# Patient Record
Sex: FEMALE | Race: WHITE
[De-identification: names, ages, dates, MRNs, and addresses within clinical notes are randomized per-mention and may not be internally consistent; named-entity substitution may affect disease eponyms.]

---

## 2017-03-24 ENCOUNTER — HOSPITAL ENCOUNTER (OUTPATIENT)
Dept: HOSPITAL 17 - CLAB | Age: 82
End: 2017-03-24
Attending: INTERNAL MEDICINE
Payer: MEDICARE

## 2017-03-24 DIAGNOSIS — R60.9: Primary | ICD-10-CM

## 2017-03-24 LAB
ANION GAP SERPL CALC-SCNC: 7 MEQ/L (ref 5–15)
BUN SERPL-MCNC: 19 MG/DL (ref 7–18)
CHLORIDE SERPL-SCNC: 103 MEQ/L (ref 98–107)
GFR SERPLBLD BASED ON 1.73 SQ M-ARVRAT: 53 ML/MIN (ref 89–?)
HCO3 BLD-SCNC: 30.4 MEQ/L (ref 21–32)
POTASSIUM SERPL-SCNC: 4.3 MEQ/L (ref 3.5–5.1)
SODIUM SERPL-SCNC: 140 MEQ/L (ref 136–145)

## 2017-03-24 PROCEDURE — 36415 COLL VENOUS BLD VENIPUNCTURE: CPT

## 2017-03-24 PROCEDURE — 80048 BASIC METABOLIC PNL TOTAL CA: CPT

## 2017-05-13 ENCOUNTER — HOSPITAL ENCOUNTER (OUTPATIENT)
Dept: HOSPITAL 17 - NEPE | Age: 82
Setting detail: OBSERVATION
LOS: 4 days | Discharge: HOME | End: 2017-05-17
Attending: SPECIALIST | Admitting: SPECIALIST
Payer: MEDICARE

## 2017-05-13 VITALS
DIASTOLIC BLOOD PRESSURE: 72 MMHG | RESPIRATION RATE: 16 BRPM | OXYGEN SATURATION: 97 % | SYSTOLIC BLOOD PRESSURE: 158 MMHG | TEMPERATURE: 98.2 F | HEART RATE: 78 BPM

## 2017-05-13 VITALS
HEART RATE: 76 BPM | SYSTOLIC BLOOD PRESSURE: 107 MMHG | DIASTOLIC BLOOD PRESSURE: 56 MMHG | OXYGEN SATURATION: 98 % | RESPIRATION RATE: 18 BRPM

## 2017-05-13 VITALS — WEIGHT: 190.26 LBS | HEIGHT: 62 IN | BODY MASS INDEX: 35.01 KG/M2

## 2017-05-13 VITALS
RESPIRATION RATE: 18 BRPM | OXYGEN SATURATION: 96 % | DIASTOLIC BLOOD PRESSURE: 74 MMHG | SYSTOLIC BLOOD PRESSURE: 131 MMHG | HEART RATE: 77 BPM

## 2017-05-13 DIAGNOSIS — I10: ICD-10-CM

## 2017-05-13 DIAGNOSIS — Z79.82: ICD-10-CM

## 2017-05-13 DIAGNOSIS — Z92.21: ICD-10-CM

## 2017-05-13 DIAGNOSIS — I25.2: ICD-10-CM

## 2017-05-13 DIAGNOSIS — Z92.3: ICD-10-CM

## 2017-05-13 DIAGNOSIS — Z95.5: ICD-10-CM

## 2017-05-13 DIAGNOSIS — Z85.3: ICD-10-CM

## 2017-05-13 DIAGNOSIS — M19.90: ICD-10-CM

## 2017-05-13 DIAGNOSIS — I25.119: Primary | ICD-10-CM

## 2017-05-13 DIAGNOSIS — F17.290: ICD-10-CM

## 2017-05-13 DIAGNOSIS — E66.9: ICD-10-CM

## 2017-05-13 DIAGNOSIS — E78.5: ICD-10-CM

## 2017-05-13 DIAGNOSIS — J06.9: ICD-10-CM

## 2017-05-13 DIAGNOSIS — Z88.8: ICD-10-CM

## 2017-05-13 DIAGNOSIS — J44.9: ICD-10-CM

## 2017-05-13 DIAGNOSIS — Z79.02: ICD-10-CM

## 2017-05-13 DIAGNOSIS — K21.9: ICD-10-CM

## 2017-05-13 LAB
ANION GAP SERPL CALC-SCNC: 8 MEQ/L (ref 5–15)
APTT BLD: 26.4 SEC (ref 24.3–30.1)
BASOPHILS # BLD AUTO: 0.1 TH/MM3 (ref 0–0.2)
BASOPHILS NFR BLD: 0.8 % (ref 0–2)
BUN SERPL-MCNC: 31 MG/DL (ref 7–18)
CHLORIDE SERPL-SCNC: 104 MEQ/L (ref 98–107)
CK SERPL-CCNC: 203 U/L (ref 26–192)
EOSINOPHIL # BLD: 0.3 TH/MM3 (ref 0–0.4)
EOSINOPHIL NFR BLD: 3 % (ref 0–4)
ERYTHROCYTE [DISTWIDTH] IN BLOOD BY AUTOMATED COUNT: 13.6 % (ref 11.6–17.2)
GFR SERPLBLD BASED ON 1.73 SQ M-ARVRAT: 51 ML/MIN (ref 89–?)
HCO3 BLD-SCNC: 29.4 MEQ/L (ref 21–32)
HCT VFR BLD CALC: 38 % (ref 35–46)
HEMO FLAGS: (no result)
INR PPP: 0.9 RATIO
LYMPHOCYTES # BLD AUTO: 2.8 TH/MM3 (ref 1–4.8)
LYMPHOCYTES NFR BLD AUTO: 32.6 % (ref 9–44)
MAGNESIUM SERPL-MCNC: 2.5 MG/DL (ref 1.5–2.5)
MCH RBC QN AUTO: 30.9 PG (ref 27–34)
MCHC RBC AUTO-ENTMCNC: 33.9 % (ref 32–36)
MCV RBC AUTO: 91.4 FL (ref 80–100)
MONOCYTES NFR BLD: 8.9 % (ref 0–8)
NEUTROPHILS # BLD AUTO: 4.6 TH/MM3 (ref 1.8–7.7)
NEUTROPHILS NFR BLD AUTO: 54.7 % (ref 16–70)
PLATELET # BLD: 188 TH/MM3 (ref 150–450)
POTASSIUM SERPL-SCNC: 4.2 MEQ/L (ref 3.5–5.1)
PROTHROMBIN TIME: 10.4 SEC (ref 9.8–11.6)
RBC # BLD AUTO: 4.15 MIL/MM3 (ref 4–5.3)
SODIUM SERPL-SCNC: 141 MEQ/L (ref 136–145)
WBC # BLD AUTO: 8.5 TH/MM3 (ref 4–11)

## 2017-05-13 PROCEDURE — 71010: CPT

## 2017-05-13 PROCEDURE — 93005 ELECTROCARDIOGRAM TRACING: CPT

## 2017-05-13 PROCEDURE — 82552 ASSAY OF CPK IN BLOOD: CPT

## 2017-05-13 PROCEDURE — 85610 PROTHROMBIN TIME: CPT

## 2017-05-13 PROCEDURE — 85730 THROMBOPLASTIN TIME PARTIAL: CPT

## 2017-05-13 PROCEDURE — 85025 COMPLETE CBC W/AUTO DIFF WBC: CPT

## 2017-05-13 PROCEDURE — 85027 COMPLETE CBC AUTOMATED: CPT

## 2017-05-13 PROCEDURE — 80061 LIPID PANEL: CPT

## 2017-05-13 PROCEDURE — C1769 GUIDE WIRE: HCPCS

## 2017-05-13 PROCEDURE — 85002 BLEEDING TIME TEST: CPT

## 2017-05-13 PROCEDURE — 82550 ASSAY OF CK (CPK): CPT

## 2017-05-13 PROCEDURE — C1893 INTRO/SHEATH, FIXED,NON-PEEL: HCPCS

## 2017-05-13 PROCEDURE — 80048 BASIC METABOLIC PNL TOTAL CA: CPT

## 2017-05-13 PROCEDURE — 84484 ASSAY OF TROPONIN QUANT: CPT

## 2017-05-13 PROCEDURE — 93306 TTE W/DOPPLER COMPLETE: CPT

## 2017-05-13 PROCEDURE — G0378 HOSPITAL OBSERVATION PER HR: HCPCS

## 2017-05-13 PROCEDURE — 83735 ASSAY OF MAGNESIUM: CPT

## 2017-05-13 PROCEDURE — 99291 CRITICAL CARE FIRST HOUR: CPT

## 2017-05-13 PROCEDURE — 93458 L HRT ARTERY/VENTRICLE ANGIO: CPT

## 2017-05-13 RX ADMIN — NITROGLYCERIN SCH MG: 0.4 TABLET SUBLINGUAL at 23:24

## 2017-05-13 RX ADMIN — NITROGLYCERIN SCH MG: 0.4 TABLET SUBLINGUAL at 23:29

## 2017-05-13 RX ADMIN — NITROGLYCERIN SCH MG: 0.4 TABLET SUBLINGUAL at 23:34

## 2017-05-13 NOTE — RADRPT
EXAM DATE/TIME:  05/13/2017 23:12 

 

[facility] COMPARISON:     

CHEST SINGLE AP, August 23, 2016, 10:42.

 

                     

INDICATIONS :     

Chest pain.

                     

 

MEDICAL HISTORY :     

Hypertension.  Myocardial infarction.  Carcinoma, breast.   COPD.   

 

SURGICAL HISTORY :     

Mastectomy, left.   

 

ENCOUNTER:     

Initial                                        

 

ACUITY:     

1 day      

 

PAIN SCORE:     

3/10

 

LOCATION:      

chest substernal.

 

FINDINGS:     

A single view of the chest demonstrates the lungs to be symmetrically aerated without evidence of mas
s, infiltrate or effusion.  The cardiomediastinal contours are unremarkable.  Osseous structures are 
intact.

 

CONCLUSION:     

No acute disease.  No significant change has occurred.  

 

 

 

 Zach Vicente MD on May 13, 2017 at 23:30           

Board Certified Radiologist.

 This report was verified electronically.

## 2017-05-13 NOTE — PD
HPI


Chief Complaint:  Chest Pain


Time Seen by Provider:  22:58


Travel History


International Travel<30 days:  No


Contact w/Intl Traveler<30days:  No


Traveled to known affect area:  No





History of Present Illness


HPI


81-year-old female with history of CAD with previous PCI to the mid LAD, HTN, 

HLD here with complaint of chest pressure.  Patient states that today she is 

been having substernal chest pressure, heaviness with associated shortness of 

breath and nausea.  She took 2 nitroglycerin around 7:30 and 8 PM without 

improvement of her symptoms prompting ER visit.  At this time patient's pain is 

mild, intermittent.  She states it feels similar to when she was hospitalized 

for her stent in 2016.  She continues to follow with Dr. armenta.  

States that she has not had any provocative testing since her PCI in 2016.





PFSH


Past Medical History


Hx Anticoagulant Therapy:  Yes


Arthritis:  Yes


Asthma:  No


Autoimmune Disease:  No


Blood Disorders:  No


Anxiety:  No


Depression:  No


Heart Rhythm Problems:  No


Cancer:  Yes (LEFT BREAST)


Cardiovascular Problems:  Yes (stent 16)


High Cholesterol:  No


Chemotherapy:  No


Chest Pain:  Yes


Congestive Heart Failure:  No


COPD:  Yes


Diabetes:  No


Diminished Hearing:  No


Endocrine:  No


Gastrointestinal Disorders:  Yes


Genitourinary:  Yes


Hypertension:  Yes


Immune Disorder:  No


Implanted Vascular Access Dvce:  Yes (cardiac stent)


Kidney Stones:  Yes


Musculoskeletal:  Yes


Neurologic:  No


Psychiatric:  No


Reproductive:  Yes (TOTAL HYSTERECTOMY)


Respiratory:  Yes


Immunizations Current:  No


Myocardial Infarction:  Yes ("SMALL MI YEARS AGO")


Radiation Therapy:  Yes


Sleep Apnea:  No


Thyroid Disease:  No


Tetanus Vaccination:  > 5 Years


Influenza Vaccination:  No


Menopausal:  Yes


:  13


Para:  12


Miscarriage:  1


Dilation and Curettage (D&C):  Yes





Past Surgical History


Abdominal Surgery:  No


AICD:  No


Arteriovenous Shunt:  No


Body Medical Devices:  cardiac stent, bliateral lens implants


Cardiac Surgery:  Yes (angioplasty 16)


Ear Surgery:  No


Endocrine Surgery:  No


Eye Surgery:  Yes (BL CATARACT REMOVAL, BL LENSE IMPLANT)


Genitourinary Surgery:  No


Gynecologic Surgery:  Yes (TOTAL HYSTERECTOMY)


Hysterectomy:  Yes


Insulin Pump:  No


Joint Replacement:  No


Mastectomy:  Yes (L MASTECTOMY)


Neurologic Surgery:  No


Oral Surgery:  No


Pacemaker:  No


Thoracic Surgery:  Yes


Other Surgery:  Yes (L PARTIAL MASTECTOMY)





Social History


Alcohol Use:  No


Tobacco Use:  No (uses snuff)


Substance Use:  No





Allergies-Medications


(Allergen,Severity, Reaction):  


Coded Allergies:  


     Lisinopril (Verified  Allergy, Mild, COUGH, 17)


Reported Meds & Prescriptions





Reported Meds & Active Scripts


Active


Nitrostat (Nitroglycerin) 0.3 Mg Subl 0.3 Mg SL PRN PRN


     FOR CHEST PAIN


Zantac 150 Mg Tab (Ranitidine HCl) 150 Mg Tab 150 Mg PO BID


K-Dur (Potassium Chloride) 20 Meq Tabcr 20 Meq PO BID


Plavix (Clopidogrel Bisulfate) 75 Mg Tab 75 Mg PO DAILY


Lipitor 80 Mg Tab (Atorvastatin) 80 Mg Tab 80 Mg PO HS


Imdur 30 Mg (Isosorbide Mononitrate) 30 Mg Tabcr 30 Mg PO DAILY@07 30 Days


Metoprolol Tartrate 25 mg (Metoprolol Tartrate) 25 Mg Tab 25 Mg PO Q12HR 30 Days


Losartan Potassium 50 Mg Tab 100 Mg PO DAILY 30 Days


Hydrodiuril (Hydrochlorothiazide) 25 Mg Tab 25 Mg PO DAILY 30 Days


Ecotrin (Aspirin) 81 Mg Tabec 81 Mg PO DAILY 30 Days








Review of Systems


Except as stated in HPI:  all other systems reviewed are Neg





Physical Exam


Narrative


GENERAL: Elderly female in no acute distress


SKIN: Focused skin assessment warm/dry.


HEAD:Normocephalic. 


EYES:  No scleral icterus. No injection or drainage. 


ENT:  Mucous membranes pink and moist.


NECK: Supple


CARDIOVASCULAR: Regular rate and rhythm.  No murmur appreciated.  No tenderness 

to palpation of the chest wall


RESPIRATORY: No accessory muscle use. Clear to auscultation. Breath sounds 

equal bilaterally. 


GASTROINTESTINAL: Abdomen soft, non-tender, nondistended.  Obese


MUSCULOSKELETAL: Trace BLE edema. 


NEUROLOGICAL: Awake and alert. Normal speech.


PSYCHIATRIC: Appropriate mood and affect; insight and judgment normal.





Data


Data


Last Documented VS





Vital Signs








  Date Time  Temp Pulse Resp B/P Pulse Ox O2 Delivery O2 Flow Rate FiO2


 


17 23:29  76 18 107/56 98 Room Air  


 


17 22:52 98.2       








Orders





 Electrocardiogram (17 23:00)


Basic Metabolic Panel (Bmp) (17 23:00)


Ckmb (Isoenzyme) Profile (17 23:00)


Complete Blood Count With Diff (17 23:00)


Magnesium (Mg) (17 23:00)


Prothrombin Time / Inr (Pt) (17 23:00)


Act Partial Throm Time (Ptt) (17 23:00)


Troponin I (17 23:00)


Chest, Single Ap (17 23:00)


Ecg Monitoring (17 23:00)


Bilateral Bp Monitoring (17 23:00)


Iv Access Insert/Monitor (17:00)


Oximetry (17 23:00)


Aspirin Chew (Aspirin Chew) (17 23:00)


Sodium Chloride 0.9% Flush (Ns Flush) (17 23:00)


Nitroglycerin Sl (Nitrostat Sl) (17 23:15)


Morphine Inj (Morphine Inj) (17 23:45)


CKMB (17 23:10)


CKMB% (17 23:10)


Heparin Infusion JULIA.Q1H (17 23:52)


Heparin Inj (Heparin Inj) (17 00:00)


Heparin Inj (Heparin Inj) (17 06:00)


Heparin Inj (Heparin Inj) (17 06:00)


Heparin-D5w Inj (Heparin-D5w Inj) (17 00:00)


Cbc No Diff, Includes Plts (17 06:00)


Act Partial Throm Time (Ptt) (17 06:52)


Occult Blood (Hemoccult) Stool (17 23:52)





Labs








 Laboratory Tests








Test 17





 23:10


 


White Blood Count 8.5 TH/MM3


 


Red Blood Count 4.15 MIL/MM3


 


Hemoglobin 12.9 GM/DL


 


Hematocrit 38.0 %


 


Mean Corpuscular Volume 91.4 FL


 


Mean Corpuscular Hemoglobin 30.9 PG


 


Mean Corpuscular Hemoglobin 33.9 %





Concent 


 


Red Cell Distribution Width 13.6 %


 


Platelet Count 188 TH/MM3


 


Mean Platelet Volume 8.5 FL


 


Neutrophils (%) (Auto) 54.7 %


 


Lymphocytes (%) (Auto) 32.6 %


 


Monocytes (%) (Auto) 8.9 %


 


Eosinophils (%) (Auto) 3.0 %


 


Basophils (%) (Auto) 0.8 %


 


Neutrophils # (Auto) 4.6 TH/MM3


 


Lymphocytes # (Auto) 2.8 TH/MM3


 


Monocytes # (Auto) 0.8 TH/MM3


 


Eosinophils # (Auto) 0.3 TH/MM3


 


Basophils # (Auto) 0.1 TH/MM3


 


CBC Comment DIFF FINAL 


 


Differential Comment  


 


Prothrombin Time 10.4 SEC


 


Prothromb Time International 0.9 RATIO





Ratio 


 


Activated Partial 26.4 SEC





Thromboplast Time 


 


Sodium Level 141 MEQ/L


 


Potassium Level 4.2 MEQ/L


 


Chloride Level 104 MEQ/L


 


Carbon Dioxide Level 29.4 MEQ/L


 


Anion Gap 8 MEQ/L


 


Blood Urea Nitrogen 31 MG/DL


 


Creatinine 1.04 MG/DL


 


Estimat Glomerular Filtration 51 ML/MIN





Rate 


 


Random Glucose 96 MG/DL


 


Calcium Level 9.0 MG/DL


 


Magnesium Level 2.5 MG/DL


 


Total Creatine Kinase 203 U/L


 


Troponin I 0.06 NG/ML














MDM


Medical Decision Making


Medical Screen Exam Complete:  Yes


Emergency Medical Condition:  Yes


Medical Record Reviewed:  Yes


Differential Diagnosis


81-year-old female with history of CAD with previous PCI to the mid LAD, HTN, 

HLD here with complaint of chest pain this evening, shortness of breath and 

nausea.  Differential includes ACS, atypical chest pain, musculoskeletal, GERD, 

and less likely PE or dissection.


Narrative Course


Patient placed on monitor, IV established and blood obtained.  A twelve-lead 

EKG shows sinus rhythm.  Patient has T-wave inversions in leads 1, aVL, V4 

through V6.  This is similar to patient's previous EKG.  She was given aspirin, 

nitroglycerin.  Portable chest x-ray obtained that by my read shows no acute 

abnormalities.  CBC, BMP, magnesium, CK-MB, troponin, coags notable for 

troponin 0.06.  Patient still having pain after nitroglycerin 1, blood 

pressure drop.  She was given morphine with improvement of her symptoms.  

Patient will be placed on heparin drip and admitted to medicine with cardiology 

consult for further management.


Critical Care Narrative


Aggregate critical care time was 35 minutes. Time to perform other separately 

billable procedures was not included in the critical care time. My time did not 

include minutes spent treating any other patients simultaneously or on 

activities that did not directly contribute to the patient's treatment.  





The services I provided to this patient were to treat and/or prevent clinically 

significant deterioration that could result in: Cardiopulmonary decompensation, 

death, disability





I provided critical care services requiring my management, as noted below:


Chart data review, documentation time, medication orders and management, vital 

sign assessments/reviewing monitor data, ordering and reviewing lab tests, 

ordering and interpreting/reviewing x-rays and diagnostic studies, care of the 

patient and discussion of the patient with the admitting physicians.





Diagnosis





 Primary Impression:  


 Non-ST elevation MI (NSTEMI)





Admitting Information


Admitting Physician Requests:  Admit








Kell Mayer MD May 13, 2017 23:14

## 2017-05-14 VITALS — HEART RATE: 66 BPM

## 2017-05-14 VITALS
TEMPERATURE: 97.8 F | OXYGEN SATURATION: 97 % | RESPIRATION RATE: 18 BRPM | SYSTOLIC BLOOD PRESSURE: 150 MMHG | DIASTOLIC BLOOD PRESSURE: 87 MMHG | HEART RATE: 66 BPM

## 2017-05-14 VITALS
HEART RATE: 67 BPM | RESPIRATION RATE: 18 BRPM | OXYGEN SATURATION: 97 % | DIASTOLIC BLOOD PRESSURE: 67 MMHG | SYSTOLIC BLOOD PRESSURE: 130 MMHG

## 2017-05-14 VITALS — HEART RATE: 64 BPM

## 2017-05-14 VITALS — HEART RATE: 58 BPM

## 2017-05-14 VITALS
OXYGEN SATURATION: 98 % | RESPIRATION RATE: 18 BRPM | SYSTOLIC BLOOD PRESSURE: 120 MMHG | HEART RATE: 61 BPM | DIASTOLIC BLOOD PRESSURE: 63 MMHG

## 2017-05-14 VITALS
TEMPERATURE: 97.4 F | DIASTOLIC BLOOD PRESSURE: 66 MMHG | SYSTOLIC BLOOD PRESSURE: 124 MMHG | HEART RATE: 67 BPM | OXYGEN SATURATION: 98 %

## 2017-05-14 VITALS
SYSTOLIC BLOOD PRESSURE: 128 MMHG | OXYGEN SATURATION: 97 % | TEMPERATURE: 97.3 F | RESPIRATION RATE: 18 BRPM | HEART RATE: 60 BPM | DIASTOLIC BLOOD PRESSURE: 70 MMHG

## 2017-05-14 VITALS
SYSTOLIC BLOOD PRESSURE: 121 MMHG | HEART RATE: 68 BPM | RESPIRATION RATE: 16 BRPM | OXYGEN SATURATION: 97 % | TEMPERATURE: 97.4 F | DIASTOLIC BLOOD PRESSURE: 70 MMHG

## 2017-05-14 VITALS
DIASTOLIC BLOOD PRESSURE: 72 MMHG | SYSTOLIC BLOOD PRESSURE: 138 MMHG | RESPIRATION RATE: 18 BRPM | OXYGEN SATURATION: 98 % | TEMPERATURE: 97.5 F | HEART RATE: 68 BPM

## 2017-05-14 VITALS — HEART RATE: 68 BPM

## 2017-05-14 VITALS — HEART RATE: 60 BPM

## 2017-05-14 VITALS — HEART RATE: 62 BPM

## 2017-05-14 VITALS — HEART RATE: 70 BPM

## 2017-05-14 VITALS — SYSTOLIC BLOOD PRESSURE: 136 MMHG | DIASTOLIC BLOOD PRESSURE: 65 MMHG

## 2017-05-14 LAB
APTT BLD: 48.9 SEC (ref 24.3–30.1)
APTT BLD: 56.8 SEC (ref 24.3–30.1)
CK MB SERPL-MCNC: 2.5 NG/ML (ref 0.5–3.6)
CK SERPL-CCNC: 167 U/L (ref 26–192)

## 2017-05-14 RX ADMIN — HEPARIN SODIUM SCH MLS/HR: 10000 INJECTION, SOLUTION INTRAVENOUS at 22:21

## 2017-05-14 RX ADMIN — ATORVASTATIN CALCIUM SCH MG: 80 TABLET, FILM COATED ORAL at 21:30

## 2017-05-14 RX ADMIN — CLOPIDOGREL BISULFATE SCH MG: 75 TABLET, FILM COATED ORAL at 08:31

## 2017-05-14 RX ADMIN — FAMOTIDINE SCH MG: 20 TABLET, FILM COATED ORAL at 08:31

## 2017-05-14 RX ADMIN — METOPROLOL TARTRATE SCH MG: 25 TABLET, FILM COATED ORAL at 08:31

## 2017-05-14 RX ADMIN — ASPIRIN 81 MG SCH MG: 81 TABLET ORAL at 08:31

## 2017-05-14 RX ADMIN — HEPARIN SODIUM SCH MLS/HR: 10000 INJECTION, SOLUTION INTRAVENOUS at 00:16

## 2017-05-14 RX ADMIN — METOPROLOL TARTRATE SCH MG: 25 TABLET, FILM COATED ORAL at 21:30

## 2017-05-14 NOTE — EC
Study

 

Study Date:05/14/2017

 

 

 

STUDY CONCLUSIONS

 

SUMMARY

 

- Left ventricle: The cavity size was normal. Wall thickness was

increased in a pattern of mild LVH. Systolic function was normal.

The estimated ejection fraction was in the range of 55% to 60%.

Wall motion was normal; there were no regional wall motion

abnormalities.

- Aortic valve: Valve area: 3.05cm^2 (Vmax).

- Mitral valve: Mildly calcified annulus.

 

-------------------------------------------------------------------

If LV function is below 40, please consider prescribing an ACEI or

ARB or document rationale for non-use.

 

-------------------------------------------------------------------

PROCEDURE DATA

 

STUDY STATUS:

Elective. Procedure: Transthoracic echocardiography.

Image quality was adequate. Scanning was performed from the

parasternal, apical, and subcostal acoustic windows. Study

completion: The patient tolerated the procedure well.

Transthoracic echocardiography. M-mode, complete 2D, complete

spectral Doppler, and color Doppler. Height: Height: 62in. Weight:

Weight: 184.6lb. Body mass index: BMI: 33.8kg/m^2. Body surface

area:   BSA: 1.85m^2. Patient status: Inpatient.

 

-------------------------------------------------------------------

CARDIAC ANATOMY

 

LEFT VENTRICLE:

The cavity size was normal. Wall thickness was

increased in a pattern of mild LVH. Systolic function was normal.

The estimated ejection fraction was in the range of 55% to 60%.

Wall motion was normal; there were no regional wall motion

abnormalities.

 

AORTIC VALVE:

Trileaflet; mildly thickened leaflets. Doppler:

Transvalvular velocity was within the normal range. There was no

stenosis. No regurgitation.  Valve area: 3.05cm^2 (Vmax). Indexed

valve area: 1.65cm^2/m^2 (Vmax).

 

AORTA:

Aortic root: The aortic root was normal in size.

 

MITRAL VALVE:

Mildly calcified annulus. Doppler: Transvalvular

velocity was within the normal range. There was no evidence for

stenosis. No regurgitation.  Peak gradient: 3mm Hg (D).

 

LEFT ATRIUM:

The atrium was normal in size.

 

RIGHT VENTRICLE:

The cavity size was normal. Wall thickness was

normal.

 

PULMONIC VALVE:

Doppler: Transvalvular velocity was within the

normal range. There was no evidence for stenosis. No regurgitation.

 

TRICUSPID VALVE:

Structurally normal valve. Doppler: Transvalvular

velocity was within the normal range. No regurgitation.

 

PULMONARY ARTERY:

The main pulmonary artery was normal-sized.

Systolic pressure was within the normal range.

 

RIGHT ATRIUM:

The atrium was normal in size.

 

PERICARDIUM:

There was no pericardial effusion.

 

-------------------------------------------------------------------

 

Patient weight: 184.6lb

_Ejection fraction:_ 65-75%

_Fractional shortening:_ 32%

up to 5Kg 5-11.5Kg 11.6-22.9Kg 23-45Kg 45-57Kg

Aortic Root 7-13      <17      13-22       17-27   17-27

LA diam     6-13      <23      24-38       33-47   37-40

RVID        10-17     7-15     7-15        7-18    8-17

LVIDd       12-22     <32      24-38       33-47   37-40

LVPW        2-4       3-6      5-7         6-8     7-8

IVS         2-4       3-6      5-7         6-8     7-8

 

-------------------------------------------------------------------

 

BASIC MEASUREMENTS                                     ADULT NORMAL

Left ventricle

LV internal dimension, ED, chordal      *42.2 mm       43-52

level, PLAX

LV internal dimension, ES, chordal       34.3 mm       23-38

level, PLAX

Fractional shortening, chordal level,     *19 %        >29

PLAX

LV posterior wall thickness, ED          12.1 mm       ------------

IVS/LVPW ratio, ED                       0.99          <1.3

Ventricular septum

Septal thickness, ED                       12 mm       ------------

Aortic valve

Leaflet separation                         23 mm       15-26

Aorta

Ascending aorta anterior-posterior         39 mm       ------------

diameter, S

 

BASIC MEASUREMENTS                                     ADULT NORMAL

Aortic valve

Leaflet separation                         23 mm       15-26

Aorta

Root diameter, ED                          23 mm       20-37

Left atrium

Anterior-posterior dimension, ES           36 mm       19-40

Anterior-posterior dimension index, ES   1.95 cm/m^2   <2.2

LA/aortic root ratio                     1.57          ------------

 

DOPPLER MEASUREMENTS                                   ADULT NORMAL

Main pulmonary artery

Pressure, S                                26 mm Hg    =30

Aortic valve

Peak velocity, S                         96.4 cm/s     ------------

Valve area, Vmax                         3.05 cm^2     ------------

Valve area index, Vmax                   1.65 cm^2/m^2 ------------

Mitral valve

Peak E-wave velocity                     81.4 cm/s     ------------

Peak A-wave velocity                     64.2 cm/s     ------------

Deceleration time                         204 ms       150-230

Peak gradient, D                            3 mm Hg    ------------

Peak E/A ratio                            1.3          ------------

Maximal regurgitant velocity              417 cm/s     ------------

Tricuspid valve

Regurgitant peak velocity                 191 cm/s     ------------

Peak RV-RA gradient, S                     15 mm Hg    ------------

Maximal regurgitant velocity              191 cm/s     ------------

Systemic veins

Estimated CVP                              10 mm Hg    ------------

Right ventricle

RV pressure, S                             28 mm Hg    <30

Pulmonic valve

Peak velocity, S                         65.2 cm/s     ------------

 

LEGEND:

Mean values are shown as u=mean value.

Asterisk (*) marks values outside specified normal range.

Prepared and signed by

 

Jeremias Bauer

[id number]

## 2017-05-14 NOTE — MB
cc:

QUIRINO NYE OTAKAR MD

****

 

 

DATE OF CONSULTATION:  05/14/2017

 

IMPRESSIONS:

1. Chest discomfort, rule out recurrent angina. The patient has a slight

elevation in troponin and CPK.

2. Known atherosclerotic heart disease, history of LAD stent in August of 2016.

3. Hypertension.

4. Dyslipidemia.

5. Obesity.

6. Esophageal reflux disease.

7. History of breast cancer status post left mastectomy, unclear if she got

radiation

 

RECOMMENDATIONS:

1. The patient should be treated as acute coronary syndrome.  She has been

started on heparin.

2. Continue dual antiplatelet therapy. She has been started on metoprolol.

3. Will keep the patient n.p.o. tonight until seen by Dr. Kaur. She is

pain-free at this time and has a normal exam.

 

CLINICAL DATA:

Mrs. Trevino is an 81-year-old female with known coronary artery disease

admitted to the hospital with chest discomfort.  She apparently was sitting at

home yesterday when she began to develop a pain in her left axillary area that

eventually radiated across to her sternum.  She had no definite diaphoresis,

shortness of breat, _________, pyrosis. the total duration of the discomfort

was proximally three hours.  She took two sublingual nitroglycerin with partial

relief of her chest discomfort however she was still having it when she

presented the emergency room. Her EKG demonstrated asymmetric T-wave inversions

in the lateral precordial leads.  Her troponin was minimally elevated.  She

received a dose of morphine and has had no pain since that time.

 

She has a history of acute coronary syndrome and possible NSTEMI in August

culminating in cardiac catheterization and LAD stent.  She states she is

compliant with her medication.  She does not smoke.

 

Risk factors include hypertension and dyslipidemia.  She has no history of

diabetes.

 

PAST SURGICAL HISTORY:

Her past surgical history includes:

1. Left mastectomy for cancer.

2. Total hysterectomy.

3. She has had cataract surgery.

 

 

SOCIAL HISTORY:

She does not currently smoke or use alcohol.

 

MEDICATIONS:

Her medications at the time of admission to the hospital included:

1. Sublingual nitroglycerin.

2. Ranitidine 150 twice a day

3. Potassium chloride 20 twice a day.

4. Plavix 75 daily.

5. Atorvastatin 80 daily.

6. Isosorbide mononitrate 30 daily.

7. Metoprolol 25 twice a day.

8. Losartan 50 daily.

9. HydroDIURIL 25 daily.

10. Enteric coated baby aspirin.

 

She has no history of congestive heart failure.  She apparently has had cardiac

arrhythmias in the past which have not required treatment.  She has no history

of pericardial heart disease.  She has no history of seizure, stroke or TIA.

She states that she does have COPD but does not require treatment.  There is no

history of overt GI bleeding.  No history of liver, gallbladder or kidney

disease.  No history of thyroid disease.  She has had no _____ deficits or

amaurosis fugax.  No lower extremity edema claudication type symptoms. Her

chest discomfort is completely resolved.  She has had no recent febrile

illness.

 

PHYSICAL EXAMINATION:

GENERAL:  The physical exam this time demonstrates an alert oriented female in

no apparent distress.

VITAL SIGNS: Blood pressure 120/70, heart rate is 70 and regular.

HEAD, EYES, EARS, NOSE, THROAT: Anicteric sclerae.

NECK: Jugular venous pressures are normal.  There are no carotid bruits.

CHEST/LUNGS: She has clear lung fields.

CARDIAC: Distant heart sounds, regular rate and rhythm, 1/6 systolic ejection

murmur.  No diastolic murmurs or gallops.

ABDOMEN: Abdomen soft and nontender.

EXTREMITIES: Free of cyanosis, clubbing or edema. Normal pulses.

 

LABORATORY EVALUATION:

White cell count 8500, hematocrit 38%, platelet count 188,000.

 

Lytes 141, 4.2, 104, 29, BUN 31, creatinine 1.

 

Troponin 0.06, .

 

EKGS:

12-lead electrocardiogram demonstrates a normal sinus rhythm, LVH by voltage

criteria, poor R-wave progression, embryonal R-wave in V2, asymmetric T-wave

inversions are noted in the lateral precordial leads.

 

IMAGING STUDIES:

Chest x-ray is unremarkable.

 

DISCUSSION:

This is an 81-year-old female with known atherosclerotic heart disease, history

of PCI in August admitted to hospital with chest discomfort, abnormal EKG and a

slight increase in enzymes.

 

Recommendations are as noted above.

 

 

 

                              _________________________________

                              DO BRIDGETT Laura/SALAZAR

D:  5/14/2017/2:39 PM

T:  5/14/2017/3:43 PM

Visit #:  [id number]

Job #:  [id number]

## 2017-05-14 NOTE — HHI.HP
[facility] [place]


Service


[facility]


Primary Care Physician


Richardson Adkins, DO


Admission Diagnosis


NSTEMI


Diagnoses:  


Chief Complaint:  


chest pain  (Aurea Reyes)





Travel History


International Travel<30 Days:  No


Contact w/Intl Traveler <30 Da:  No


Traveled to Known Affected Are:  No (Aurea Reyes)


History of Present Illness


This a pleasant 81-year-old elderly female with past medical history of MI, CAD

, previous angioplasty and stent to LAD August 2016 per Dr. armenta, 

hypertension, hyperlipidemia, COPD.  Patient presented to emergency room 

complaining of chest tightness had developed a she was resting.  Patient 

indicates his throat over the left breast radiated across chest wall, she feels 

slightly short of breath and weak, no nausea, no vomiting, no diaphoresis.  She 

took 2 nitroglycerin without any improvement and then was given a thorough 1.  

Chest discomfort was relieved by nitroglycerin.  Patient indicates she had this 

similar presentation when she had stent in August 2016.  She follows up with 

Dr. armenta regularly and is compliant with medications.  Patient was evaluated 

in the emergency room, laboratory workup was completed, essentially 

unremarkable.  EKG shows sinus rhythm with T-wave inversions in leads 1, aVL, 

V4 through V6.  This is similar to previous EKG.  Chest x-ray did not reveal 

any acute abnormalities.  Troponin was slightly elevated 0.06.  Patient was 

placed on a heparin drip.  Patient is now seen in [facility], at this time she is 

chest pain-free.  Patient is admitted for further evaluation and treatment. (

Aurea Reyes)





Review of Systems


Constitutional:  DENIES: Diaphoretic episodes, Fatigue, Fever, Weight gain, 

Weight loss, Chills, Dizziness, Change in appetite, Night Sweats


Endocrine:  DENIES: Abnorml menstrual pattern, Heat/cold intolerance, Polydipsia

, Polyuria, Polyphagia


Eyes:  DENIES: Blurred vision, Diplopia, Eye inflammation, Eye pain, Vision loss

, Photosensitivity, Double Vision


Ears, nose, mouth, throat:  DENIES: Tinnitus, Hearing loss, Vertigo, Nasal 

discharge, Oral lesions, Throat pain, Hoarseness, Ear Pain, Running Nose, 

Epistaxis, Sinus Pain, Toothache, Odynophagia


Respiratory:  COMPLAINS OF: Shortness of breath,  DENIES: Apneas, Cough, Snoring

, Wheezing, Hemoptysis, Sputum production


Cardiovascular:  COMPLAINS OF: Chest pain, Dyspnea on Exertion,  DENIES: 

Palpitations, Syncope, PND, Lower Extremity Edema, Orthopnea, Claudication


Gastrointestinal:  DENIES: Abdominal pain, Black stools, Bloody stools, 

Constipation, Diarrhea, Nausea, Vomiting, Difficulty Swallowing, Anorexia


Genitourinary:  DENIES: Abnormal vaginal bleeding, Dysmenorrhea, Dyspareunia, 

Sexual dysfunction, Urinary frequency, Urinary incontinence, Urgency, Hematuria

, Dysuria, Nocturia, Vaginal discharge


Musculoskeletal:  COMPLAINS OF: Joint pain,  DENIES: Muscle aches, Stiffness, 

Joint Swelling, Back pain, Neck pain


Hematologic/lymphatic:  DENIES: Bruising, Lymphadenopathy


Immunologic/allergic:  DENIES: Eczema, Urticaria


Neurologic:  DENIES: Abnormal gait, Headache, Localized weakness, Paresthesias, 

Seizures, Speech Problems, Tremor, Poor Balance


Psychiatric:  DENIES: Anxiety, Confusion, Mood changes, Depression, 

Hallucinations, Agitation, Suicidal Ideation, Homicidal Ideation, Delusions (

Aurea Reyes)





Past Family Social History


Past Medical History


hypertension hyperlipidemia breast cancer status post radiation and chemotherapy

, CAD with MI, stent August 2016,  arthritis


COPD


Past Surgical History


Hysterectomy, right arm surgery, knee arthroplasty


cardiac cath and angioplasty/stent LAURENCE to LAD 8/24/2017


Reported Medications





Reported Meds & Active Scripts


Active


Reported


Ranitidine (Ranitidine HCl) 150 Mg Cap 150 Mg PO DAILY


Potassium Chloride CR (Potassium Chloride) 10 Meq Tab 20 Meq PO BID


Nitroglycerin SL (Nitroglycerin) 0.3 Mg Subl 0.3 Mg SL AS DIRECTED PRN


     ONE TABLET UNDER THE TONGUE AS NEEDED FOR CHEST PAIN, MAY REPEAT EVERY


     FIVE MINUTES FOR A TOTAL OF 3 DOSES OR CALL 911 IF NO RELIEF


Metoprolol Tartrate 25 Mg Tab 25 Mg PO BID


Losartan (Losartan Potassium) 100 Mg Tab 100 Mg PO DAILY


Isosorbide Mononitrate 10 Mg Tab 30 Mg PO DAILY


     Take 2 doses 7 hours apart.


[hydrodiuril]   25 Mg PO DAILY


Plavix (Clopidogrel Bisulfate) 75 Mg Tab 75 Mg PO DAILY


Lipitor (Atorvastatin Calcium) 80 Mg Tab 80 Mg PO HS


Aspirin 81 Mg Chew 81 Mg CHEW DAILY (Aurea Reyes)


Allergies:  


Coded Allergies:  


     Lisinopril (Verified  Allergy, Mild, COUGH, 5/13/17)


Active Ordered Medications





 Inpatient Medications


Aspirin (Aspirin Chew) 81 mg DAILY CHEW ;  Start 5/14/17 at 09:00


Atorvastatin Calcium (Lipitor) 80 mg HS PO ;  Start 5/14/17 at 21:00


Clopidogrel Bisulfate (Plavix) 75 mg DAILY PO ;  Start 5/14/17 at 09:00


Famotidine (Pepcid) 20 mg DAILY PO ;  Start 5/14/17 at 09:00


Heparin Sodium (Porcine) (Heparin Inj) 5,000 units UNSCH  PRN IV APTT LESS THAN 

25;  Start 5/14/17 at 06:00


Heparin Sodium (Porcine) 2500 units 2,500 units UNSCH  PRN IV APTT 25 TO 39;  

Start 5/14/17 at 06:00


Heparin Sodium/ Dextrose (Heparin-D5W Inj) 250 ml @ 0 mls/hr TITRATE IV  Last 

administered on 5/14/17at 00:16;  Start 5/14/17 at 00:00


Metoprolol Tartrate (Lopressor) 25 mg BID PO ;  Start 5/14/17 at 09:00


Morphine Sulfate (Morphine Inj) 4 mg ONCE  ONCE IV PUSH  Last administered on 5/ 14/17at 00:02;  Start 5/13/17 at 23:45;  Stop 5/13/17 at 23:46;  Status DC


Nitroglycerin (Nitrostat Sl) 0.4 mg Q5M SL  Last administered on 5/13/17at 23:24

;  Start 5/13/17 at 23:15;  Stop 5/13/17 at 23:26;  Status DC


Sodium Chloride (NS Flush) 2 ml UNSCH  PRN IVF FLUSH AFTER USING IV ACCESS;  

Start 5/13/17 at 23:00


Family History


Positive for coronary artery disease in her mother


Social History


Patient is raising her 7-year-old grandson, one of her daughters lives close 

by.  Uses snuff since she was 7 years old, no alcohol abuse, no substance 

abuse.  Uses a walker for ambulation. (Aurea Reyes)





Physical Exam


Vital Signs





 Vital Signs








  Date Time  Temp Pulse Resp B/P Pulse Ox O2 Delivery O2 Flow Rate FiO2


 


5/14/17 07:01  70      


 


5/14/17 06:13  62      


 


5/14/17 05:00  68      


 


5/14/17 04:08  66      


 


5/14/17 03:57 97.4 67  124/66 98   


 


5/14/17 03:15  71 18 136/65 98 Nasal Cannula 2 


 


5/14/17 02:14  61 18 120/63 98 Nasal Cannula 2 


 


5/14/17 00:36  67 18 130/67 97 Nasal Cannula 2 


 


5/13/17 23:29  76 18 107/56 98 Room Air  


 


5/13/17 23:23  77 18 131/74 96 Room Air  


 


5/13/17 22:52 98.2 78 16 158/72 97   








Physical Exam


GENERAL: This is a well-nourished, well-developed patient, in no apparent 

distress.


SKIN: No rashes, ecchymoses or lesions. Cool and dry.


HEAD: Atraumatic. Normocephalic. No temporal or scalp tenderness.


EYES: Pupils equal round and reactive. Extraocular motions intact. No scleral 

icterus. No injection or drainage. 


ENT: Nose without bleeding, purulent drainage or septal hematoma. Throat 

without erythema, tonsillar hypertrophy or exudate. Uvula midline. Airway 

patent.


NECK: Trachea midline. No JVD or lymphadenopathy. Supple, nontender, no 

meningeal signs.


CARDIOVASCULAR: Regular rate and rhythm without murmurs, gallops, or rubs. 


RESPIRATORY: Clear to auscultation. Breath sounds equal bilaterally. No wheezes

, rales, or rhonchi.  


GASTROINTESTINAL: Abdomen soft, non-tender, nondistended. No hepato-splenomegaly

, or palpable masses. No guarding.


MUSCULOSKELETAL: Extremities without clubbing, cyanosis, or edema. No joint 

tenderness, effusion, or edema noted. No calf tenderness. Negative Homans sign 

bilaterally.


NEUROLOGICAL: Awake and alert. Cranial nerves II through XII intact.  Motor and 

sensory grossly within normal limits. Five out of 5 muscle strength in all 

muscle groups.  Normal speech.


Laboratory





Laboratory Tests








Test 5/13/17 5/14/17





 23:10 05:55


 


White Blood Count 8.5  


 


Red Blood Count 4.15  


 


Hemoglobin 12.9  


 


Hematocrit 38.0  


 


Mean Corpuscular Volume 91.4  


 


Mean Corpuscular Hemoglobin 30.9  


 


Mean Corpuscular Hemoglobin 33.9  





Concent  


 


Red Cell Distribution Width 13.6  


 


Platelet Count 188  


 


Mean Platelet Volume 8.5  


 


Neutrophils (%) (Auto) 54.7  


 


Lymphocytes (%) (Auto) 32.6  


 


Monocytes (%) (Auto) 8.9  


 


Eosinophils (%) (Auto) 3.0  


 


Basophils (%) (Auto) 0.8  


 


Neutrophils # (Auto) 4.6  


 


Lymphocytes # (Auto) 2.8  


 


Monocytes # (Auto) 0.8  


 


Eosinophils # (Auto) 0.3  


 


Basophils # (Auto) 0.1  


 


CBC Comment DIFF FINAL  


 


Differential Comment   


 


Prothrombin Time 10.4  


 


Prothromb Time International 0.9  





Ratio  


 


Activated Partial 26.4  48.9 





Thromboplast Time  


 


Sodium Level 141  


 


Potassium Level 4.2  


 


Chloride Level 104  


 


Carbon Dioxide Level 29.4  


 


Anion Gap 8  


 


Blood Urea Nitrogen 31  


 


Creatinine 1.04  


 


Estimat Glomerular Filtration 51  





Rate  


 


Random Glucose 96  


 


Calcium Level 9.0  


 


Magnesium Level 2.5  


 


Total Creatine Kinase 203  


 


Creatine Kinase MB 2.5  


 


Creatine Kinase MB % 1.2  


 


Troponin I 0.06  





 (Aurea Reyes)


Result Diagram:  


5/13/17 2310 5/13/17 2310





Imaging





Last Impressions








Chest X-Ray 5/13/17 2300 Signed





Impressions: 





 Service Date/Time:  Saturday, May 13, 2017 23:12 - CONCLUSION:  No acute 





 disease.  No significant change has occurred.       Zach Vicente MD 





 (Aurea Reyes)





Assessment and Plan


Problem List:  


(1) Chest pain


(2) Non-ST elevation MI (NSTEMI)


(3) HTN (hypertension)


(4) Dyspnea on exertion


(5) Hyperlipemia


(6) Snuff user


(7) COPD (chronic obstructive pulmonary disease)


Assessment and Plan


Admit to Dr. Cifuentes





81-year-old elderly female with history of prior MI, CAD, prior stent to LAD 

August 2016.  Presented to emergency room complaining of chest tightness with 

shortness of breath.  Mild elevation in troponin.  Admitted with non-STEMI


-Continue with heparin drip


Consult cardiology for evaluation


Continue with aspirin and Plavix


Continue with Lopressor 25 mg by mouth twice a day


Continue with statins, Lipitor 80 mg by mouth daily at bedtime





COPD, stable


Continue with supplemental oxygen


DuoNeb's when necessary for any wheezing





Hypertension, stable


Continue home medications





Hyperlipidemia


-Lipid profile has been ordered


Continue statins





Daily snuff use


-patient has been counseled about use





Home medications reviewed, initiated as indicated


Continue with heparin for DVT prophylaxis


Plan of care has been discussed with the patient, attending and registered 

nurse.  Further management of the patient will be dependent on the hospital 

course





This patient was seen by myself and Dr. Cifuentes, this H&P is written on his behalf

 (Aurea Reyes)


Assessment and Plan


Patient seen and examined as above


Chart reviewed including meds labs radiological data and notes.


Discussed with patient and patient's family at bedside in detail


Plan of care discussed with NAKITA


Condition guarded (Héctor Cifuentes MD)





Problem Qualifiers





(1) Chest pain:  


Qualified Code:  R07.2 - Precordial pain


(2) HTN (hypertension):  


Qualified Code:  I10 - Essential hypertension


(3) Hyperlipemia:  


Qualified Code:  E78.5 - Hyperlipidemia, unspecified hyperlipidemia type


(4) COPD (chronic obstructive pulmonary disease):  


Qualified Code:  J44.9 - Chronic obstructive pulmonary disease, unspecified 

COPD type





Aurea Reyes May 14, 2017 08:42


Héctor Cifuentes MD May 14, 2017 09:21

## 2017-05-14 NOTE — EKG
Date Performed: 05/13/2017       Time Performed: 23:05:53

 

PTAGE:      81 years

 

EKG:      ECTOPIC ATRIAL RHYTHM ST DEVIATION AND MODERATE T-WAVE ABNORMALITY, CONSIDER LATERAL ISCHEM
IA Possible left ventricular hypertrophy. Possible old inferior wall infarct. Possible old anterosept
al infarct. When compared to previous tracing, the ectopic rhythm is new, But otherwise no significan
t serial change. ABNORMAL ECG

 

PREVIOUS TRACING       :   08/27/2016   [id number]

 

DOCTOR:   Emerald Castillo  Interpretating Date/Time  05/14/2017 15:07:04

## 2017-05-15 VITALS — HEART RATE: 68 BPM

## 2017-05-15 VITALS
DIASTOLIC BLOOD PRESSURE: 88 MMHG | TEMPERATURE: 97.1 F | SYSTOLIC BLOOD PRESSURE: 146 MMHG | OXYGEN SATURATION: 98 % | RESPIRATION RATE: 18 BRPM | HEART RATE: 74 BPM

## 2017-05-15 VITALS
OXYGEN SATURATION: 96 % | SYSTOLIC BLOOD PRESSURE: 146 MMHG | RESPIRATION RATE: 18 BRPM | HEART RATE: 75 BPM | DIASTOLIC BLOOD PRESSURE: 87 MMHG | TEMPERATURE: 98 F

## 2017-05-15 VITALS
OXYGEN SATURATION: 97 % | SYSTOLIC BLOOD PRESSURE: 139 MMHG | DIASTOLIC BLOOD PRESSURE: 71 MMHG | TEMPERATURE: 97.6 F | HEART RATE: 63 BPM | RESPIRATION RATE: 18 BRPM

## 2017-05-15 VITALS
TEMPERATURE: 97.5 F | DIASTOLIC BLOOD PRESSURE: 73 MMHG | SYSTOLIC BLOOD PRESSURE: 135 MMHG | HEART RATE: 68 BPM | OXYGEN SATURATION: 95 % | RESPIRATION RATE: 18 BRPM

## 2017-05-15 VITALS — HEART RATE: 84 BPM

## 2017-05-15 VITALS — HEART RATE: 60 BPM

## 2017-05-15 VITALS
SYSTOLIC BLOOD PRESSURE: 135 MMHG | RESPIRATION RATE: 16 BRPM | HEART RATE: 66 BPM | DIASTOLIC BLOOD PRESSURE: 69 MMHG | TEMPERATURE: 97.4 F | OXYGEN SATURATION: 98 %

## 2017-05-15 VITALS — HEART RATE: 73 BPM

## 2017-05-15 VITALS
HEART RATE: 77 BPM | SYSTOLIC BLOOD PRESSURE: 152 MMHG | TEMPERATURE: 97.4 F | DIASTOLIC BLOOD PRESSURE: 83 MMHG | RESPIRATION RATE: 16 BRPM | OXYGEN SATURATION: 97 %

## 2017-05-15 VITALS — HEART RATE: 64 BPM

## 2017-05-15 VITALS
RESPIRATION RATE: 16 BRPM | SYSTOLIC BLOOD PRESSURE: 143 MMHG | DIASTOLIC BLOOD PRESSURE: 68 MMHG | OXYGEN SATURATION: 96 % | TEMPERATURE: 97.9 F | HEART RATE: 64 BPM

## 2017-05-15 VITALS — HEART RATE: 58 BPM

## 2017-05-15 VITALS — HEART RATE: 76 BPM

## 2017-05-15 VITALS — HEART RATE: 70 BPM

## 2017-05-15 VITALS — HEART RATE: 74 BPM

## 2017-05-15 VITALS — HEART RATE: 78 BPM

## 2017-05-15 VITALS — HEART RATE: 66 BPM

## 2017-05-15 LAB
APTT BLD: 56.9 SEC (ref 24.3–30.1)
ERYTHROCYTE [DISTWIDTH] IN BLOOD BY AUTOMATED COUNT: 13.9 % (ref 11.6–17.2)
HCT VFR BLD CALC: 40 % (ref 35–46)
HDLC SERPL-MCNC: 47.5 MG/DL (ref 40–60)
LDLC SERPL-MCNC: 114 MG/DL (ref 0–99)
MCH RBC QN AUTO: 31.2 PG (ref 27–34)
MCHC RBC AUTO-ENTMCNC: 34.1 % (ref 32–36)
MCV RBC AUTO: 91.6 FL (ref 80–100)
PLATELET # BLD: 201 TH/MM3 (ref 150–450)
RBC # BLD AUTO: 4.37 MIL/MM3 (ref 4–5.3)
REVIEW FLAG: (no result)
WBC # BLD AUTO: 6.4 TH/MM3 (ref 4–11)

## 2017-05-15 RX ADMIN — ATORVASTATIN CALCIUM SCH MG: 80 TABLET, FILM COATED ORAL at 21:04

## 2017-05-15 RX ADMIN — CLOPIDOGREL BISULFATE SCH MG: 75 TABLET, FILM COATED ORAL at 08:10

## 2017-05-15 RX ADMIN — FAMOTIDINE SCH MG: 20 TABLET, FILM COATED ORAL at 08:09

## 2017-05-15 RX ADMIN — METOPROLOL TARTRATE SCH MG: 25 TABLET, FILM COATED ORAL at 21:04

## 2017-05-15 RX ADMIN — METOPROLOL TARTRATE SCH MG: 25 TABLET, FILM COATED ORAL at 08:09

## 2017-05-15 RX ADMIN — ASPIRIN 81 MG SCH MG: 81 TABLET ORAL at 08:10

## 2017-05-15 NOTE — HHI.PR
Subjective


Remarks


Sitting on side of bed for lunch


Walked to bathroom without shortness of breath or pain


Alert oriented


Afebrile


Denies any chest pain or shortness of breath (Leigh Ann Kruse)





Objective


Objective Results


-





 Vital Signs








  Date Time  Temp Pulse Resp B/P Pulse Ox O2 Delivery O2 Flow Rate FiO2


 


5/15/17 11:15 97.5 68 18 135/73 95   


 


5/15/17 08:15 97.6 63 18 139/71 97   


 


5/15/17 06:00  60      


 


5/15/17 05:00  74      


 


5/15/17 04:00  73      


 


5/15/17 03:00 97.4 73 16 135/69 98   


 


5/15/17 03:00  66      


 


5/15/17 02:00  70      


 


5/15/17 01:00  60      


 


5/15/17 00:00 97.1 74 18 146/88 98   


 


5/15/17 00:00  74      


 


5/14/17 23:00  60      


 


5/14/17 22:00  66      


 


5/14/17 21:00  70      


 


5/14/17 20:00  70      


 


5/14/17 19:00 97.5 68 18 138/72 98   


 


5/14/17 19:00  68      


 


5/14/17 16:01  68      


 


5/14/17 15:10 97.8 66 18 150/87 97   


 


5/14/17 15:00  62      


 


5/14/17 14:00  64      








 I/O








 5/14/17 5/14/17 5/14/17 5/15/17 5/15/17 5/15/17





 07:00 15:00 23:00 07:00 15:00 23:00


 


Intake Total 240 ml  597 ml   


 


Output Total   700 ml   


 


Balance 240 ml  -103 ml   


 


      


 


Intake Oral 240 ml  480 ml   


 


IV Total   117 ml   


 


Output Urine Total   700 ml   


 


# Voids   2 3  





 (Leigh Ann Kruse)


Result Diagram:  


5/15/17 0535                                                                   

             5/13/17 2310








ROS


General:  Fatigue (mild), Other (10 point ROS done positives noted other 

systems negative or unremarkable)


Cardiac:  Chest Pain (controlled) (Leigh Ann Kruse)





Physical Exam


Physical Exam


PHYSICAL EXAMINATION


GENERAL:  This is a well-developed, obese   female 


who appears to be in no acute distress.  She  is alert and awake, appropriate 

communication


HEAD:  Normocephalic without any lesion or mass noted.  Facial features


appear symmetric.


OROPHARYNGEAL:  Oropharynx without erythema or edema.


NECK:  Supple.  No nuchal rigidity or lymphadenopathy.


Trachea midline without deviation.  


CARDIAC:  Regular rhythm, regular rate, S1 and S2 are heard.  Heart sounds 

distant


LUNGS:  Clear to auscultation bilaterally anteriorly and posteriorly without 

wheezes rales or rhonchi


ABDOMEN:  Soft, round, nontender, no organomegaly or masses.  Bowel sounds are


heard in all four quadrants.  No rebound.  No guarding.


EXTREMITIES:  No edema.  Pulses equal bilateral.  


NEUROLOGICAL:  Patient mood and affect appropriate. No focal deficit


SKIN:Warm and moist, dry


Objective Remarks


I had no chest pain since I got here, but this pain felt just like my other 

heart pain. (Leigh Ann Kruse)





A/P


Assessment and Plan


1) Chest pain


(2) Non-ST elevation MI (NSTEMI)


(3) HTN (hypertension)


(4) Dyspnea on exertion


(5) Hyperlipemia


(6) Snuff user


(7) COPD (chronic obstructive pulmonary disease)





Chest pain, non-STEMI


-Continue with heparin drip, DVT prophylaxis and known coronary artery disease


Cardiac consult done, patient maintained on heparin drip


Heart catheter scheduled for tomorrow p.m., with possible stent


O2 at 2 L as needed


Activity out of bed to bathroom, no shortness of breath no pain since 

admission.  Patient is tolerating activity with no problems


Instructed to call immediately for any chest pain or shortness of breath





COPD, stable, never smoked, but was around secondhand smoke, uses snuff


DuoNeb's when necessary for any wheezing





Hypertension, stable


Continue home medications





Hyperlipidemia


Medical management





Bowel regimen


Pepcid for PUD prophylaxis





Discussed with nurse


Discussed with patient


Discussed with Dr. Valdez, patient seen on his behalf


 (Leigh Ann Kruse)


Assessment and Plan


pt is seen and examined as above 


meds and labs reviewed


plan of care dw arnp


dw pt 


ilan cardiology input 


for c cath tomorrow hopefully  (Héctor Valdez MD)








Leigh Ann Kruse May 15, 2017 13:46


Héctor Valdez MD May 15, 2017 14:56

## 2017-05-15 NOTE — PD.CARD.PN
Subjective


Subjective Remarks


No CP or SOB, feels better





Objective


Medications





 Current Medications








 Medications


  (Trade)  Dose


 Ordered  Sig/Kaitlin


 Route  Start Time


 Stop Time Status Last Admin


 


  (NS Flush)  2 ml  UNSCH  PRN


 IVF  5/13/17 23:00


     


 


 


  (Heparin Inj)  5,000 units  UNSCH  PRN


 IV  5/14/17 06:00


     


 


 


 Heparin Sodium


  (Porcine) 2500


 units  2,500 units  UNSCH  PRN


 IV  5/14/17 06:00


     


 


 


  (Heparin-D5W Inj)  250 ml @ 0


 mls/hr  TITRATE


 IV  5/14/17 00:00


    5/14/17 22:21


 


 


  (Aspirin Chew)  81 mg  DAILY


 CHEW  5/14/17 09:00


    5/15/17 08:10


 


 


  (Lipitor)  80 mg  HS


 PO  5/14/17 21:00


    5/14/17 21:30


 


 


  (Plavix)  75 mg  DAILY


 PO  5/14/17 09:00


    5/15/17 08:10


 


 


  (Lopressor)  25 mg  BID


 PO  5/14/17 09:00


    5/15/17 08:09


 


 


  (Pepcid)  20 mg  DAILY


 PO  5/14/17 09:00


    5/15/17 08:09


 








Vital Signs / I&O





 Vital Signs








  Date Time  Temp Pulse Resp B/P Pulse Ox O2 Delivery O2 Flow Rate FiO2


 


5/15/17 15:15 98.0 75 18 146/87 96   


 


5/15/17 13:01  66      


 


5/15/17 12:00  68      


 


5/15/17 11:15 97.5 68 18 135/73 95   


 


5/15/17 11:00  58      


 


5/15/17 10:00  64      


 


5/15/17 09:00  66      


 


5/15/17 08:15 97.6 63 18 139/71 97   


 


5/15/17 08:00  58      


 


5/15/17 07:00  58      


 


5/15/17 06:00  60      


 


5/15/17 05:00  74      


 


5/15/17 04:00  73      


 


5/15/17 03:00 97.4 73 16 135/69 98   


 


5/15/17 03:00  66      


 


5/15/17 02:00  70      


 


5/15/17 01:00  60      


 


5/15/17 00:00 97.1 74 18 146/88 98   


 


5/15/17 00:00  74      


 


5/14/17 23:00  60      


 


5/14/17 22:00  66      


 


5/14/17 21:00  70      


 


5/14/17 20:00  70      


 


5/14/17 19:00 97.5 68 18 138/72 98   


 


5/14/17 19:00  68      








 I/O








 5/14/17 5/14/17 5/14/17 5/15/17 5/15/17 5/15/17





 06:59 14:59 22:59 06:59 14:59 22:59


 


Intake Total 240 ml  597 ml   


 


Output Total   700 ml   


 


Balance 240 ml  -103 ml   


 


      


 


Intake Oral 240 ml  480 ml   


 


IV Total   117 ml   


 


Output Urine Total   700 ml   


 


# Voids   2 3  








Physical Exam


GENERAL: In NAD


SKIN: Warm and dry.


HEAD: Normocephalic.


EYES: No scleral icterus. No injection or drainage. 


NECK: Supple, trachea midline. No JVD or lymphadenopathy.


CARDIOVASCULAR: Regular rate and rhythm without murmurs, gallops, or rubs. 


RESPIRATORY: Breath sounds equal bilaterally. No accessory muscle use.


GASTROINTESTINAL: Abdomen soft, non-tender, nondistended. 


MUSCULOSKELETAL: No cyanosis, or edema. 





Laboratory





Laboratory Tests








Test 5/14/17 5/15/17





 17:10 05:35


 


Total Creatine Kinase 167 U/L 


 


Troponin I 0.06 NG/ML 0.12 NG/ML


 


White Blood Count  6.4 TH/MM3


 


Red Blood Count  4.37 MIL/MM3


 


Hemoglobin  13.6 GM/DL


 


Hematocrit  40.0 %


 


Mean Corpuscular Volume  91.6 FL


 


Mean Corpuscular Hemoglobin  31.2 PG


 


Mean Corpuscular Hemoglobin  34.1 %





Concent  


 


Red Cell Distribution Width  13.9 %


 


Platelet Count  201 TH/MM3


 


Mean Platelet Volume  8.7 FL


 


Activated Partial  56.9 SEC





Thromboplast Time  


 


Triglycerides Level  131 MG/DL


 


Cholesterol Level  188 MG/DL


 


LDL Cholesterol  114 MG/DL


 


HDL Cholesterol  47.5 MG/DL


 


Cholesterol/HDL Ratio  3.95 RATIO








Imaging





Last Impressions








Chest X-Ray 5/13/17 2300 Signed





Impressions: 





 Service Date/Time:  Saturday, May 13, 2017 23:12 - CONCLUSION:  No acute 





 disease.  No significant change has occurred.       Zach Vicente MD 











Assessment and Plan


Problem List:  


(1) Non-ST elevation MI (NSTEMI)


(2) CAD (coronary artery disease)


(3) Presence of stent in LAD coronary artery


(4) HTN (hypertension)


(5) Hyperlipemia


Assessment and Plan


No recurrent angina. Enzymes abnormal, c/w NSTEMI. Proceed with cath/PCI 

tomorrow. D/w pt and family, they wish to proceed.





Problem Qualifiers





(1) HTN (hypertension):  


Qualified Code:  I10 - Essential hypertension


(2) Hyperlipemia:  


Qualified Code:  E78.5 - Hyperlipidemia, unspecified hyperlipidemia type





Asha Kaur MD May 15, 2017 16:59

## 2017-05-16 VITALS
RESPIRATION RATE: 18 BRPM | SYSTOLIC BLOOD PRESSURE: 140 MMHG | DIASTOLIC BLOOD PRESSURE: 81 MMHG | TEMPERATURE: 97.6 F | OXYGEN SATURATION: 96 % | HEART RATE: 78 BPM

## 2017-05-16 VITALS
SYSTOLIC BLOOD PRESSURE: 140 MMHG | DIASTOLIC BLOOD PRESSURE: 68 MMHG | TEMPERATURE: 97.7 F | OXYGEN SATURATION: 96 % | RESPIRATION RATE: 18 BRPM | HEART RATE: 66 BPM

## 2017-05-16 VITALS
TEMPERATURE: 97.6 F | RESPIRATION RATE: 20 BRPM | SYSTOLIC BLOOD PRESSURE: 130 MMHG | OXYGEN SATURATION: 94 % | DIASTOLIC BLOOD PRESSURE: 57 MMHG | HEART RATE: 78 BPM

## 2017-05-16 VITALS
DIASTOLIC BLOOD PRESSURE: 86 MMHG | OXYGEN SATURATION: 97 % | RESPIRATION RATE: 18 BRPM | HEART RATE: 65 BPM | TEMPERATURE: 97.7 F | SYSTOLIC BLOOD PRESSURE: 158 MMHG

## 2017-05-16 VITALS
SYSTOLIC BLOOD PRESSURE: 151 MMHG | DIASTOLIC BLOOD PRESSURE: 81 MMHG | RESPIRATION RATE: 18 BRPM | TEMPERATURE: 97.8 F | OXYGEN SATURATION: 97 % | HEART RATE: 66 BPM

## 2017-05-16 VITALS — HEART RATE: 68 BPM

## 2017-05-16 VITALS — HEART RATE: 78 BPM

## 2017-05-16 VITALS — HEART RATE: 64 BPM

## 2017-05-16 VITALS — HEART RATE: 72 BPM

## 2017-05-16 VITALS — HEART RATE: 88 BPM

## 2017-05-16 VITALS
RESPIRATION RATE: 18 BRPM | TEMPERATURE: 97.6 F | OXYGEN SATURATION: 97 % | SYSTOLIC BLOOD PRESSURE: 153 MMHG | DIASTOLIC BLOOD PRESSURE: 88 MMHG | HEART RATE: 64 BPM

## 2017-05-16 VITALS — HEART RATE: 62 BPM

## 2017-05-16 VITALS — HEART RATE: 66 BPM

## 2017-05-16 VITALS — HEART RATE: 67 BPM

## 2017-05-16 VITALS — HEART RATE: 71 BPM

## 2017-05-16 VITALS — HEART RATE: 58 BPM

## 2017-05-16 VITALS — HEART RATE: 59 BPM

## 2017-05-16 VITALS — HEART RATE: 92 BPM

## 2017-05-16 LAB
APTT BLD: 50.4 SEC (ref 24.3–30.1)
ERYTHROCYTE [DISTWIDTH] IN BLOOD BY AUTOMATED COUNT: 13.8 % (ref 11.6–17.2)
HCT VFR BLD CALC: 40.4 % (ref 35–46)
HDLC SERPL-MCNC: 46.3 MG/DL (ref 40–60)
LDLC SERPL-MCNC: 74 MG/DL (ref 0–99)
MCH RBC QN AUTO: 31.3 PG (ref 27–34)
MCHC RBC AUTO-ENTMCNC: 34.4 % (ref 32–36)
MCV RBC AUTO: 91 FL (ref 80–100)
PLATELET # BLD: 177 TH/MM3 (ref 150–450)
RBC # BLD AUTO: 4.44 MIL/MM3 (ref 4–5.3)
REVIEW FLAG: (no result)
WBC # BLD AUTO: 7.7 TH/MM3 (ref 4–11)

## 2017-05-16 RX ADMIN — METOPROLOL TARTRATE SCH MG: 25 TABLET, FILM COATED ORAL at 08:17

## 2017-05-16 RX ADMIN — ASPIRIN 81 MG SCH MG: 81 TABLET ORAL at 08:18

## 2017-05-16 RX ADMIN — FAMOTIDINE SCH MG: 20 TABLET, FILM COATED ORAL at 08:17

## 2017-05-16 RX ADMIN — CLOPIDOGREL BISULFATE SCH MG: 75 TABLET, FILM COATED ORAL at 08:17

## 2017-05-16 RX ADMIN — ATORVASTATIN CALCIUM SCH MG: 80 TABLET, FILM COATED ORAL at 21:56

## 2017-05-16 RX ADMIN — METOPROLOL TARTRATE SCH MG: 25 TABLET, FILM COATED ORAL at 21:57

## 2017-05-16 NOTE — CATHPROC
Patient Name: NARAYAN ALMANZA     MRN: [id number]       Study #: [id number]

Initial MD: Asha Kaur       YOB: 1935  Study Date: 05/16/2017

 

 

               Cardiac Catheterization Report

 

 

 

 

5/16/2017 3:20:13 PM         Financial #: [id number]     1 of 10

Patient Name: NARAYAN ALMANZA         MRN: [id number]                  Study #: [id number]

Initial MD: Asha Kaur           YOB: 1935             Study Date: 05/16/2017

 

 

                   Entire Case Report

Patient Information

Patient Name   NARAYAN ALMANZA   Date of Birth   1935         Age     81 years

 

MRN        [id number]          Financial #    [id number]        Gender    F

 

AlternateID                   Lab Number    3

 

Accession #                   Room Number

 

Height (in)    62.0             Height (cm)    157.4           BSA     1.87

 

Weight (lbs)   188.5             Weight (kg)    85.7

 

Patient Address/Phone Number

Home Address              City                 State   Zip Code      Home Phone Number

 

[address] [place]     [address] [phone]

 

Study Information

Study Number   Admission           Scheduled Start                Study Start

 

950-17      05/14/2017 05/16/2017 05/16/2017

 

Study Type

 

Left Heart Cath

 

Referring Institution              Admit Source                  Facility Department

 

1                        Other                     [facility] - Cath Lab

 

Physician and Clinical Staff

Initial Asha Ybarra        Circulator        Jake Bhatt,RN

 

                        Circulator        Carmela Alarcon,OSEI

 

                        Other          cathlab, cathlab

 

                        Recorder         Yulia Lane,RRT TECH2

 

                        Scrub          Brady Wilson,RT(R)

 

Procedures Performed

Procedure                    Location (Site)               Vessel Name

 

Angiogram LV                  LV                      Ventricle

Coronary Angiograms                LCA                    Left Coronary

Coronary Angiograms                RCA                    Right Coronary

L Heart Cath

 

 

 

 

5/16/2017 3:20:13 PM             Financial #: [id number]                2 of 10

Patient Name: NARAYAN ALMANZA            MRN: [id number]           Study #: [id number]

Initial MD: Asha Kaur              YOB: 1935      Study Date: 05/16/2017

 

 

Equipment

Time            Description            Size      Mfg Part Number   Used/Scraped

                    TRANSDUCER, TRUWAVE

14:48    JOSUE PADILLA                        *       [id number]       Used

                    W/MAURICE

                                             [phone]



                                             *[id number]

                                             [phone]



                                             *[id number]

                                             [phone]



                                             *[id number]

                                             [id number]

14:48    [employer]    PACK, CCL CUSTOM          *                 Used

                                             *[id number]

14:48    [employer]       PEN, SKIN DUAL W/ RULER      *       [id number]      Used

 

14:48    [employer] MED[employer]       WIRE, 3MMJ .035 180CM       180CM     [id number]     Used

                    PROBE COVER, STERILE

14:48    [employer]                       *       [id number]       Used

                    ULTRASOUND W/ GEL

                                             [id number]

14:48    [employer]           MANIFOLD, 4 PORT          *                 Used

                                             *[id number]

                                             [id number]

14:48    [employer]           TUBING, HIGH PRESSURE 48"     48"                Used

                                             *[id number]

14:48    [employer]          OMNIPAQUE, 350 MG, 100ML      100ML     [id number]       Used

 

14:54    [employer]          OMNIPAQUE, 350 MG, 50ML      50ML      [id number]       Used

                                             [id number]

14:48    [employer]       BLANKET,WARM AIR CCL        *                 Used

                                             *[id number]

14:48    [employer]      SHEATH, FR5 TERUMO (10CM)     FR 5      [id number]       Used

 

 

Insurance Information

Insurance Payor

 

Medicaid, Private Health Insurance

 

Third Party          Third Party Number

[employer]ARE

 

History: Current Medications

Medication         Dosage/Unit       Route       Frequency  Last Date/Time Taken

 

PLAVIX

 

LIPITOR

 

ASA

 

 

History: Allergies

Allergy              Reaction

 

Lisinopril             COUGH

 

 

 

 

5/16/2017 3:20:13 PM                 Financial #: [id number]         3 of 10

Patient Name: NARAYAN ALMANZA              MRN: [id number]                 Study #: [id number]

Initial MD: Asha Kaur                YOB: 1935            Study Date: 05/16/201
7

 

 

History: Risk Factors

                       Family History of

Hypertension     Dyslipidemia                    Previous MI    Previous Heart Failure

                       Premature CAD

Yes         Yes           Yes            Yes        No

Prior Valve

           Prior PCI        Prior PCIDate       Prior CABG

Surgery

No          Yes           08/24/2016        No

           Cerebrovascular     Peripheral Artery     Chronic Lung

On Dialysis                                        Diabetes

           Disease         Disease          Disease

No          No            No            Yes        No

 

 

History: Symptoms/Diagnosis Selection Items

Chest pain

 

 

 

Labs

Hgb (g/dl)        Hct (%)         WBC (l/cumm)        Platelets (thousands)

 

12.00-18.00        37.00-55.00       4.80-10.80         140..00

 

13.9           40.4           7.7             177

 

Glucose (mg/dl)      BUN (mg/dl)       Creatinine (mg/dl)    BUN:Creatinine (1:x)

60..00       8.00-20.00        0.10-9.00         10.00-20.00

 

96            31            1.0            31

 

Na (meq/l)        K (meq/l)

 

138..00       3.80-5.10

 

141            4.2

 

INR (PTT:PT)

 

0.50-2.00

 

0.9

 

Troponin I (ng/ml)    CPK (u/l)

 

0.40-2.30         37..00

 

0.1            167

 

 

 

 

Medication

Medication Total Dose (Bolus/Oral)

Medication               Total Dosage/Unit

 

1% XYLOCAINE                20 mL

 

FENTANYL                 100 mcg

 

VERSED                    2 mg

 

 

 

 

5/16/2017 3:20:13 PM                   Financial #: [id number]               4 of 10

Patient Name: NARAYAN ALMANZA              MRN: [id number]                   Study #: [id number]

Initial MD: Asha Kaur                YOB: 1935              Study Date: 05/16/2
017

 

Medications (Bolus/Oral)

Medication           Time Given          Dosage/Unit       Administered By      Reason

 

VERSED             5/16/2017 2:43:15 PM      2 mg         Carmela Alarcon

2 mg VERSED given in lab by Carmela Alarcon RN in Right Antecubital via Peripheral IV.

 

FENTANYL            5/16/2017 2:44:00 PM     50 mcg         Carmela Alarcon

50 mcg FENTANYL given in lab by Carmela Alarcon RN in Right Hand via Peripheral IV.

 

FENTANYL            5/16/2017 2:44:57 PM     50 mcg         Carmela Alarcon

Patient arrived on 50 mcg FENTANYL given by Carmela Alarcon RN in Right Antecubital via Peripheral I
V.

 

1% XYLOCAINE          5/16/2017 2:49:18 PM     20 mL         Asha Kaur

20 mL 1% XYLOCAINE given in lab by Asha Kaur in Right Groin via Subcutaneous.

 

Medication (Drip)

Medication           Time Given          Dosage/Unit      Concentration/Unit  Diluent (ml)   Solution


 

IV Solutions          5/16/2017 2:32:02 PM      0 mL (IV)                          NaCl .9

Patient arrived on IV Solutions given by Asha Kaur in Right Antecubital via Peripheral IV. Pump
/Drip Flow = 20 ml/hr using NaCl .9.

 

 

Initial Case Assessment

Cardiovascular

HR             NIBP

 

64             122/111

 

Edema Present        Skin color             Skin

 

None            Normal               Warm Dry

 

Circulatory - Right Pulses

Dorsalis Pedis       Femoral

 

1              1

 

Scale (0,1,2,3,4,d)

 

Circulatory - Left Pulses

Dorsalis Pedis       Femoral

 

1              1

 

Scale (0,1,2,3,4,d)

 

Neurological State

              Oriented to time-place-

Alert                        Moves all extremities

              person

 

Respiration - General

Respiration Rate

              SpO2 (%)

(B/min)

13             98

 

 

 

 

5/16/2017 3:20:13 PM                   Financial #: [id number]                    5 of 10

Patient Name: NARAYAN ALMANZA           MRN: [id number]               Study #: [id number]

Initial MD: Asha Kaur             YOB: 1935          Study Date: 05/16/2017

 

 

Final Case Assessment

Cardiovascular

HR            NIBP

 

70            159/84

 

Edema Present       Skin color          Skin

 

None           Normal            Warm Dry

 

Circulatory - Right Pulses

Dorsalis Pedis      Femoral

 

1             1

 

Scale (0,1,2,3,4,d)

 

Circulatory - Left Pulses

Dorsalis Pedis      Femoral

 

1             1

 

Scale (0,1,2,3,4,d)

 

Neurological State

             Oriented to time-place-

Alert                       Moves all extremities

             person

 

Respiration - General

Respiration Rate

             SpO2 (%)

(B/min)

13            98

 

Vitals Summary

                                            Pain

  Time  HR     NIBP   SpO2  Resp   Temp   EtCO2  Apnea   Narinder      Florentino Comment

                                            Level

14:34:52   64    122/111  98.0                      10    0    2

 

14:40:28  106    168/97   97.0   13                  10    0    2

 

14:45:04   70    148/84   96.0   17                  10    0    2

 

14:50:01   72    108/76   97.0   16                  10    0    2

 

14:55:23   70    174/89   99.0   19                  10    0    2

 

15:00:05   73    154/83   98.0   15                  10    0    2

 

15:05:00   69    160/88   98.0   14                  10    0    2

 

15:10:03   64    162/88   98.0   17                  10    0    2

 

15:15:05   67    159/84   98.0   15                  10    0    2

 

 

 

 

5/16/2017 3:20:13 PM               Financial #: [id number]              6 of 10

Patient Name: NARAYAN ALMANZA             MRN: [id number]                Study #: [id number]

Initial MD: Asha Kaur               YOB: 1935           Study Date: 05/16/2017

 

 

Narinder Score Summary

Time         Activity      Resp         Circ     LOC       Color       Total Score

14:34:52         2         2            2       2       2           10

 

14:40:28         2         2            2       2       2           10

 

14:45:04         2         2            2       2       2           10

 

14:50:01         2         2            2       2       2           10

 

14:55:23         2         2            2       2       2           10

 

15:00:05         2         2            2       2       2           10

 

15:05:00         2         2            2       2       2           10

 

15:10:03         2         2            2       2       2           10

 

15:15:05         2         2            2       2       2           10

 

 

Narinder Score Definition Table

Activity - 0                   Activity - 1               Activity - 2

No Movement to Command              Weak Hand Grasp              Lift Head, Good Hand Grasp

 

Respiration - 0                 Respiration - 1              Respiration - 2

Apneic or Obstructed               Shallow Breath, Airway Adjunct      Deep Breath, Cough Freely

 

Circulation - 0                 Circulation - 1              Circulation - 2

B/P > 50% Admission B/P             B/P > 20-50% Admission B/P        B/P Stable X3

 

Level of Consciousness - 0            Level of Consciousness - 1        Level of Consciousness - 2

Not Responding                  Arousable On Calling           Awake and Aware

 

Color - 0                    Color- 1                 Color - 2

Cyanotic Lips, Nailbed, Skin           Pale, Dusky                Pink Or Normal

 

 

Chronological Log

Time      Study Chronological Log

 

14:31:49    Patient arrived via Bed.

 

14:31:51    Patient Name, D.O.B, / Armband Verified By R.N.

 

14:31:52    Consent signed by the physician and the patient and verified by the Cath Lab staff.

 

14:31:53    Pre-op and post- op instructions given; patient acknowledges understanding of instruction
s.

 

14:31:55    Patient has been NPO for More than 6Hrs.

 

14:31:56    Skin Breakdown-

 

14:31:58    Barbie Prominences Protected

 

14:32:01    A # 20 IV was noted in the Antecubital (right). Grade = 0

        Patient arrived on IV Solutions given by Asha Kaur in Right Antecubital via Peripheral 
IV. Pump/Drip Flow = 20

14:32:02

        ml/hr using NaCl .9.

14:32:04    History and physical on the chart or being dictated.

        Vitals capture started with the following parameters, Patient=Adult, Interval=5 min, Initial 
Pressure=140 mmHg,

14:34:22

        Deflation Rate=5 mmHg

 

 

5/16/2017 3:20:13 PM                 Financial #: [id number]               7 of 10

Patient Name: NARAYAN ALMANZA           MRN: [id number]               Study #: [id number]

Initial MD: Asha Kaur             YOB: 1935          Study Date: 05/16/2017

 

14:34:52  HR=64 bpm, QHVC=149/111 mmhg, SpO2=98 %, Pain=0, Narinder=10, Florentino=2

      Assessment: Initial Case, HR=64 BPM, XEWT=891/111 mmhg, Edema=None, Color=Normal, Skin = Warm, 
Dry

      Right Pulses: Finn Ped=1, Femoral=1

14:39:15  Left Pulses: Finn Ped=1, Femoral=1

      Neurological: State=Alert, Ox3, GUNDERSON

      Respiration: Resp=13 B/min, SpO2=98 %

14:40:28  ZG=641 bpm, GWGD=354/97 mmhg, SpO2=97.0 %, Resp=13 B/min, Pain=0, Narinder=10, Florentino=2

 

14:43:15  2 mg VERSED given in lab by Carmela lAarcon RN in Right Antecubital via Peripheral IV.

 

14:43:36  Reference ECG taken

 

14:43:43  Bilateral groins prepped with 2% chlorhexidine, and with a 3 min. waiting time.

 

14:44:00  50 mcg FENTANYL given in lab by Carmela Alarcon RN in Right Hand via Peripheral IV.

 

14:44:57  Patient arrived on 50 mcg FENTANYL given by Carmela Alarcon, RN in Right Antecubital via Pe
ripheral IV.

 

14:45:04  HR=70 bpm, TTID=814/84 mmhg, SpO2=96.0 %, Resp=17 B/min, Pain=0, Narinder=10, Florentino=2

      Time Out. Correct patient, correct procedure,correct physician, ,power injector loaded or not l
oaded with contrast with

14:46:31

      surgical team present. Time Out Concurred by MD, individual staff and CRNA in procedure

14:46:46  Case Start

 

14:46:47  Pressure channel 1 zeroed.

 

14:49:18  20 mL 1% XYLOCAINE given in lab by Asha Kaur in Right Groin via Subcutaneous.

 

14:49:30  Access site was Left Femoral Artery.

 

14:50:00  A SHEATH, FR5 TERUMO (10CM) FR 5 was advanced into the Fem Art (right) using the Modified S
eldinger technique.

 

14:50:00  A PIGTAIL ANG. INFINITI CATHETER FR 5 was advanced over a wire. contrast was used for injec
tions.

 

14:50:01  HR=72 bpm, GKFN=887/76 mmhg, SpO2=97.0 %, Resp=16 B/min, Pain=0, Narinder=10, Florentino=2

      Recorded Pressure: LV, HR=86, Condition=Condition 1

14:50:03

      (Left Ventricle) /14/16

14:50:45  The LV was injected at 10 cc/sec for a total of 30. OMNIPAQUE, 350 MG, 50ML 50ML used.

      Recorded Pressure: LV, Ao, HR=78, Condition=Condition 1

14:51:25  (Left Ventricle) /18/14,

      (Aorta) Ao 143/55/94

14:51:25  Catheter was removed

      A JL 4.0 INFINITI CATHETER FR 5 was advanced over a wire. OMNIPAQUE, 350 MG, 100ML 100ML was us
ed for

14:51:55

      injections.

      Recorded Pressure: Ao, HR=68, Condition=Condition 1

14:53:45

      (Aorta) Ao 170/71/111

14:53:54  The  LCA was injected and visualized at various angles. OMNIPAQUE, 350 MG, 100ML 100ML used
.

 

14:55:07  Catheter was removed

      A JL 4.0 INFINITI CATHETER FR 5 was advanced over a wire. OMNIPAQUE, 350 MG, 100ML 100ML was us
ed for

14:55:22

      injections.

14:55:23  HR=70 bpm, DAGG=169/89 mmhg, SpO2=99.0 %, Resp=19 B/min, Pain=0, Narinder=10, Florentino=2

 

14:56:35  The  RCA was injected and visualized at various angles. OMNIPAQUE, 350 MG, 100ML 100ML used
.

 

14:56:49  Catheter was removed

 

14:56:52  Case End

 

14:57:29  ACT (Normal Range ) = 177

 

15:00:05  HR=73 bpm, HYQV=596/83 mmhg, SpO2=98 %, Resp=15 B/min, Pain=0, Narinder=10, Florentino=2

 

 

5/16/2017 3:20:13 PM                Financial #: [id number]              8 of 10

Patient Name: NARAYAN ALMANZA              MRN: [id number]             Study #: [id number]

Initial MD: Asha Kaur                YOB: 1935        Study Date: 05/16/2017

 

15:01:05   Sheath removed; pressure applied to access site.

 

15:05:00   HR=69 bpm, NIBP=160/88 mmhg, SpO2=98 %, Resp=14 B/min, Pain=0, Narinder=10, Florentino=2

 

15:10:03   HR=64 bpm, ZPCV=024/88 mmhg, SpO2=98 %, Resp=17 B/min, Pain=0, Narinder=10, Florentino=2

 

15:15:05   HR=67 bpm, JVMZ=075/84 mmhg, SpO2=98.0 %, Resp=15 B/min, Pain=0, Narinder=10, Florentino=2

       Assessment: Final Case, HR=70 BPM, LSOM=423/84 mmhg, Edema=None, Color=Normal, Skin = Warm, Dr
y

       Right Pulses: Finn Ped=1, Femoral=1

15:16:45   Left Pulses: Finn Ped=1, Femoral=1

       Neurological: State=Alert, Ox3, GUNDERSON

       Respiration: Resp=13 B/min, SpO2=98 %

15:17:06   Vitals capture stopped.

 

15:17:38   Sterile dressing applied to site

 

15:17:42   No case complications noted.

 

15:17:52   Cine recording checked.

 

15:17:56   Bedside Report will be given.

 

15:17:57   Contrast Scanned

 

15:18:01   A Left Heart Cath was performed.

 

15:19:01   Patient moved to stretcher

 

15:19:12   Clinical correlaton risk stratification.

 

 

Recorded Pressures: Condition 1

Time         Chamber       Pressure         Manual Override (*)

 

14:50:03       LV          149/14/16 s/bd/ed

 

14:51:25       LV          141/18/14 s/bd/ed

 

14:51:25       Ao          143/55/94 s/d/m

 

14:53:45       Ao          170/71/111 s/d/m

 

 

 

 

End Study - Contrast Media Used In Study

Contrast        Total Opened (mL)     Total Used (mL)       Total Wasted (mL)

 

Omnipaque       80             80              0

 

End Study - Maximum Contrast Load

Max Contrast Load (mL)

 

428.4

 

End Study - Radiation Exposure

Fluoro Time

(minutes)

1.5

 

 

 

5/16/2017 3:20:13 PM                   Financial #: [id number]           9 of 10

Patient Name: NARAYAN ALMANZA     MRN: [id number]       Study #: [id number]

Initial MD: Asha Kaur       YOB: 1935  Study Date: 05/16/2017

 

End Study - Patient Disposition

Complications    Transferred To

 

No          Telemetry Bed

 

 

 

 

5/16/2017 3:20:13 PM         Financial #: [id number]     10 of 10

## 2017-05-16 NOTE — HHI.PR
Subjective


Remarks


Sitting on chair without any apparent distress


patient had good night sleep


Walked to bathroom without shortness of breath or pain


Alert oriented


Afebrile


Denies any chest pain or shortness of breath 


review of systems a 12 point system otherwise unremarkable





Objective


Objective Results


-





 Vital Signs








  Date Time  Temp Pulse Resp B/P Pulse Ox O2 Delivery O2 Flow Rate FiO2


 


5/16/17 08:01 97.6 78 18 140/81 96   


 


5/16/17 07:01  62      


 


5/16/17 06:00  67      


 


5/16/17 05:00  72      


 


5/16/17 04:00  92      


 


5/16/17 03:00 97.8 78 18 151/81 97   


 


5/16/17 03:00  66      


 


5/16/17 02:00  68      


 


5/16/17 01:00  66      


 


5/16/17 00:00  58      


 


5/15/17 23:00 97.9 70 16 143/68 96   


 


5/15/17 23:00  64      


 


5/15/17 22:00  74      


 


5/15/17 21:00  76      


 


5/15/17 20:00  84      


 


5/15/17 19:00  72      


 


5/15/17 19:00 97.4 77 16 152/83 97   


 


5/15/17 18:00  64      


 


5/15/17 17:01  68      


 


5/15/17 16:00  78      


 


5/15/17 15:15 98.0 75 18 146/87 96   


 


5/15/17 15:00  68      


 


5/15/17 14:00  74      


 


5/15/17 13:01  66      


 


5/15/17 12:00  68      


 


5/15/17 11:15 97.5 68 18 135/73 95   


 


5/15/17 11:00  58      


 


5/15/17 10:00  64      








 I/O








 5/15/17 5/15/17 5/15/17 5/16/17 5/16/17 5/16/17





 06:59 14:59 22:59 06:59 14:59 22:59


 


Intake Total   939 ml 350 ml  


 


Output Total   600 ml 800 ml  


 


Balance   339 ml -450 ml  


 


      


 


Intake Oral   720 ml 240 ml  


 


IV Total   219 ml 110 ml  


 


Output Urine Total   600 ml 800 ml  


 


# Voids 3  4   


 


# Bowel Movements   0   








Result Diagram:  


5/16/17 0356                                                                   

             5/13/17 2310





Imaging





Last Impressions








Chest X-Ray 5/13/17 2300 Signed





Impressions: 





 Service Date/Time:  Saturday, May 13, 2017 23:12 - CONCLUSION:  No acute 





 disease.  No significant change has occurred.       Zach Vicente MD 








Other Results





Laboratory Tests








Test 5/16/17





 03:56


 


White Blood Count 7.7 


 


Red Blood Count 4.44 


 


Hemoglobin 13.9 


 


Hematocrit 40.4 


 


Mean Corpuscular Volume 91.0 


 


Mean Corpuscular Hemoglobin 31.3 


 


Mean Corpuscular Hemoglobin 34.4 





Concent 


 


Red Cell Distribution Width 13.8 


 


Platelet Count 177 


 


Mean Platelet Volume 8.4 


 


Activated Partial 50.4 





Thromboplast Time 











Physical Exam


Physical Exam


GENERAL:  This is a well-developed, obese   female 


who appears to be in no acute distress.  She  is alert and awake, appropriate 

communication


HEAD:  Normocephalic without any lesion or mass noted.  Facial features


appear symmetric.


OROPHARYNGEAL:  Oropharynx without erythema or edema.


NECK:  Supple.  No nuchal rigidity or lymphadenopathy.


Trachea midline without deviation.  


CARDIAC:  Regular rhythm, regular rate, S1 and S2 are heard.  Heart sounds 

distant


LUNGS:  Clear to auscultation bilaterally anteriorly and posteriorly without 

wheezes rales or rhonchi


ABDOMEN:  Soft, round, nontender, no organomegaly or masses.  Bowel sounds are


heard in all four quadrants.  No rebound.  No guarding.


EXTREMITIES:  No edema.  Pulses equal bilateral.  


NEUROLOGICAL:  Patient mood and affect appropriate. No focal deficit


SKIN:Warm and moist, dry


Objective Remarks


I had no chest pain since I got here, but this pain felt just like my other 

heart pain





A/P


Assessment and Plan


(1) Chest pain


(2) Non-ST elevation MI (NSTEMI)


(3) HTN (hypertension)


(4) Dyspnea on exertion


(5) Hyperlipemia


(6) Snuff user


(7) COPD (chronic obstructive pulmonary disease)





Chest pain, non-STEMI


-heparin drip, DVT prophylaxis and known coronary artery disease


Cardiac consult done, patient maintained on heparin drip


Heart catheter scheduled for today afternoon


O2 at 2 L as needed


Activity out of bed to bathroom, no shortness of breath no pain since 

admission.  Patient is tolerating activity with no problems


Instructed to call immediately for any chest pain or shortness of breath





COPD, stable, never smoked, but was around secondhand smoke, uses snuff


DuoNeb's when necessary for any wheezing





Hypertension, stable


Continue home medications





Hyperlipidemia


Medical management





Bowel regimen


Pepcid for PUD prophylaxis





Labs reviewed


Discussed with nurse


Discussed with patient


We will follow








Héctor Valdez MD May 16, 2017 09:11

## 2017-05-16 NOTE — MA
cc:

CCList****

 

 

DATE: 5/16/2017

 

INDICATION

Non ST elevation myocardial infarction, class IV angina, coronary artery

disease, history of mid LAD stenting.

 

PROCEDURE PERFORMED

1. Retrograde left heart catheterization with left ventriculography and 
selective

coronary angiography.

2. Moderate sedation.

 

ACCESS SITE

Right femoral artery.

 

EQUIPMENT USED

5 Guinean pigtail catheter, 5 Guinean JL4 and AR modified coronary artery

catheters.

 

MEDICATIONS

Versed IV, Fentanyl IV.

 

CONTRAST

Omnipaque 80 cc.

 

COMPLICATIONS

None.

 

BLOOD LOSS:

Less than 10 cc

 

METHOD OF HEMOSTASIS

Manual compression.

 

RESULTS

 

HEMODYNAMICS

Heart rate 70 beats per minute.

Left ventricular end-diastolic pressure 14 mmHg.

Left ventricle 150/4.

Aorta 150/71/111.

 

LEFT VENTRICULOGRAPHY

Left ventricular ejection fraction 60%.  Wall motion normal.  No mitral

regurgitation.

 

CORONARY ANGIOGRAPHY

The left main coronary artery patent.

 

Left descending coronary artery has mild 30% stenosis in the mid portion.

There is patent stent in the mid portion.

 

First diagonal artery has 60% stenosis at its ostium and in the proximal

portion.

 

Left circumflex artery is patent.

OM1 is patent.

OM2 and left PDA is patent.

 

Right coronary artery is small and patent.

Right PDA is small and patent.

This is co-dominant circulation.

 

DIAGNOSIS

1. Mild to moderate coronary artery disease with patent stent in the mid left

   anterior descending coronary artery.

2. Preserved left ventricular systolic function.

 

DISPOSITION

Ms. Trevino can be reassured about her cardiac status.  Her stent in the mid

LAD is patent and the study shows no evidence of disease progression.  Her left

ventricular function is well preserved. I recommend to continue her current

medical program including aggressive modification in her cardiac risk factors.

I will see her back for followup in our office after discharge.

 

 

 

                              _________________________________

                              MD KAMI Layne/MAREN

D:  5/16/2017/3:07 PM

T:  5/16/2017/7:18 PM

Visit #:  [id number]

Job #:  [id number]

NICK

## 2017-05-17 VITALS
OXYGEN SATURATION: 98 % | DIASTOLIC BLOOD PRESSURE: 71 MMHG | SYSTOLIC BLOOD PRESSURE: 137 MMHG | HEART RATE: 68 BPM | TEMPERATURE: 97.6 F | RESPIRATION RATE: 18 BRPM

## 2017-05-17 VITALS
DIASTOLIC BLOOD PRESSURE: 72 MMHG | RESPIRATION RATE: 18 BRPM | HEART RATE: 70 BPM | SYSTOLIC BLOOD PRESSURE: 139 MMHG | OXYGEN SATURATION: 96 % | TEMPERATURE: 97.8 F

## 2017-05-17 VITALS — HEART RATE: 78 BPM

## 2017-05-17 VITALS — HEART RATE: 77 BPM

## 2017-05-17 VITALS — HEART RATE: 62 BPM

## 2017-05-17 VITALS — HEART RATE: 68 BPM

## 2017-05-17 VITALS — HEART RATE: 69 BPM

## 2017-05-17 VITALS — HEART RATE: 76 BPM

## 2017-05-17 VITALS — HEART RATE: 70 BPM

## 2017-05-17 VITALS
RESPIRATION RATE: 18 BRPM | OXYGEN SATURATION: 97 % | HEART RATE: 65 BPM | DIASTOLIC BLOOD PRESSURE: 74 MMHG | TEMPERATURE: 97.5 F | SYSTOLIC BLOOD PRESSURE: 129 MMHG

## 2017-05-17 VITALS — HEART RATE: 72 BPM

## 2017-05-17 RX ADMIN — FAMOTIDINE SCH MG: 20 TABLET, FILM COATED ORAL at 08:37

## 2017-05-17 RX ADMIN — METOPROLOL TARTRATE SCH MG: 25 TABLET, FILM COATED ORAL at 08:37

## 2017-05-17 RX ADMIN — CLOPIDOGREL BISULFATE SCH MG: 75 TABLET, FILM COATED ORAL at 08:36

## 2017-05-17 RX ADMIN — ASPIRIN 81 MG SCH MG: 81 TABLET ORAL at 08:37

## 2017-05-17 NOTE — HHI.DS
Discharge Summary


Admission Date


May 14, 2017 at 00:01


Discharge Date:  May 17, 2017


Admitting Diagnosis


NSTEMI





(1) Chest pain


Diagnosis:  Principal





(2) Non-ST elevation MI (NSTEMI)


Diagnosis:  Principal





(3) HTN (hypertension)


Diagnosis:  Principal





(4) Dyspnea on exertion


Diagnosis:  Principal





(5) Hyperlipemia


Diagnosis:  Secondary





(6) Snuff user


Diagnosis:  Secondary





(7) COPD (chronic obstructive pulmonary disease)


Diagnosis:  Secondary





(8) GERD (gastroesophageal reflux disease)


(9) Viral upper respiratory illness


Brief History


This was a pleasant 81-year-old elderly female with past medical history of MI, 

CAD, previous angioplasty and stent to LAD August 2016 per Dr. armenta, 

hypertension, hyperlipidemia, COPD.  Patient presented to emergency room 

complaining of chest tightness had developed a she was resting.  Patient 

indicated pain was  over the left breast radiated across chest wall, she felt 

slightly short of breath and weak, no nausea, no vomiting, no diaphoresis.  She 

took 2 nitroglycerin without any improvement and then was given a thorough 1.  

Chest discomfort was relieved by nitroglycerin.  Patient indicated she had this 

similar presentation when she had stent in August 2016.  She follows up with 

Dr. armenta regularly and is compliant with medications.


CBC/BMP:  


5/16/17 0356                                                                   

             5/13/17 2310





Significant Findings





Laboratory Tests








Test 5/15/17 5/16/17 5/16/17





 05:35 03:56 15:54


 


Activated Partial 56.9 SEC 50.4 SEC 





Thromboplast Time (24.3-30.1) (24.3-30.1) 


 


Troponin I 0.12 NG/ML  





 (0.02-0.05)  


 


LDL Cholesterol 114 MG/DL  





 (0-99)  


 


Triglycerides Level   151 MG/DL





   ()








Imaging





Last Impressions








Chest X-Ray 5/13/17 2300 Signed





Impressions: 





 Service Date/Time:  Saturday, May 13, 2017 23:12 - CONCLUSION:  No acute 





 disease.  No significant change has occurred.       Zach Vicente MD 








PE at Discharge


GENERAL:  This is an obese, well-nourished   female 


who appears to be in no acute distress.  She  is alert 


HEAD:  Normocephalic without any lesion or mass noted.  Facial features


appear symmetric.


OROPHARYNGEAL:  Oropharynx without erythema or edema.


NECK:  Supple.  No nuchal rigidity or lymphadenopathy.


Trachea midline without deviation.  


CARDIAC:  Regular rhythm, regular rate, S1 and S2 are heard. 


LUNGS:  Clear to auscultation bilaterally.


ABDOMEN:  Soft, nontender, no organomegaly or masses.  Bowel sounds are


heard in all four quadrants.  No rebound.  No guarding.


EXTREMITIES:  no edema.  Pulses equal bilateral. 


NEUROLOGICAL:  Patient mood and affect appropriate. No focal deficit


SKIN:Warm and moist


Objective Remarks


Im ready to go home.


Hospital Course


 Patient was evaluated in the emergency room, laboratory workup was completed, 

essentially unremarkable.  EKG showed sinus rhythm with T-wave inversions in 

leads 1, aVL, V4 through V6.  This is similar to previous EKG.  Chest x-ray did 

not reveal any acute abnormalities.  Troponin was slightly elevated 0.06.  

Patient was placed on a heparin drip.  Patient was now seen in [facility], at this 

time she is chest pain-free.  Patient was admitted for further evaluation and 

treatment.





Patient was monitored with labs and vital signs every 4 hours and as needed.  

She was monitored on telemetry throughout her whole hospital stay, but had no 

dysrhythmias or problems noted.  IV access was intact the whole time.  Patient 

was placed on a heparin drip, and had cardiac catheterization after pain was 

stabilized.  It was initially felt the patient had a non-STEMI, but but heart 

catheterization showed no acute coronary artery blockages.  Patient's blood 

pressure and pain was monitored, heparin drip drip was DC'd, and patient was 

educated on taking Pepcid or Zantac twice a day for GERD.  This pain possibly 

could be attributed to reflux, stressors and obesity along with other 

comorbidities.  Patient is to exercise take her medications as prescribed and 

follow up with her PCP in 1-2 weeks and cardiology on an as-needed basis.


Patient was given DuoNeb nebulizer treatments as needed, bowel regimen was 

followed. 





These are the diagnoses that were used to treat the patient during her hospital 

stay


1) Chest pain


(2) Non-ST elevation MI (NSTEMI)


(3) HTN (hypertension)


(4) Dyspnea on exertion


(5) Hyperlipemia


(6) Snuff user


(7) COPD (chronic obstructive pulmonary disease)


8. GERD


Pt Condition on Discharge:  Stable


Discharge Disposition:  Discharge Home


Discharge Instructions


DIET: Follow Instructions for:  Heart Healthy Diet


Activities you can perform:  Regular-No Restrictions


Follow up Referrals:  


Cardiology - 4 Weeks


PCP Follow-up - 1 Week





Continued Medications:  


Aspirin (Aspirin) 81 Mg Chew


81 MG CHEW DAILY Ref 0 TAB


Atorvastatin (Lipitor) 80 Mg Tab


80 MG PO HS Cholesterol Management #30 Ref 0 TAB


Clopidogrel (Plavix) 75 Mg Tab


75 MG PO DAILY Blood Clot Prevention #30 Ref 0 TAB


Isosorbide Mononitrate (Isosorbide Mononitrate) 10 Mg Tab


30 MG PO DAILY Take 2 doses 7 hours apart. Prevent Chest Pain #60 TAB


Losartan (Losartan) 100 Mg Tab


100 MG PO DAILY Blood Pressure Management #30 Ref 0 TAB


Metoprolol Tartrate (Metoprolol Tartrate) 25 Mg Tab


25 MG PO BID #60 Ref 0 TAB


Nitroglycerin SL (Nitroglycerin SL) 0.3 Mg Subl


0.3 MG SL AS DIRECTED ONE TABLET UNDER THE TONGUE AS NEEDED FOR CHEST PAIN, MAY 

REPEAT EVERY FIVE MINUTES FOR A TOTAL OF 3 DOSES OR CALL 911 IF NO RELIEF PRN 

CHEST PAIN #100 Ref 0 TAB.SL


Potassium Chloride ER (Potassium Chloride CR) 10 Meq Tab


20 MEQ PO BID TAB


Ranitidine (Ranitidine) 150 Mg Cap


150 MG PO DAILY #30 Ref 0 CAP


 ([hydrodiuril])  


25 MG PO DAILY











Leigh Ann Kruse May 17, 2017 18:08

## 2017-05-17 NOTE — HHI.PR
Subjective


Remarks


Sitting on side of bed


Alert oriented


Afebrile


Denies any chest pain or shortness of breath


cardiac w/u neg





Objective


Objective Results


-





 Vital Signs








  Date Time  Temp Pulse Resp B/P Pulse Ox O2 Delivery O2 Flow Rate FiO2


 


5/17/17 12:43  72      


 


5/17/17 11:36 97.6 68 18 137/71 98   


 


5/17/17 11:36  68      


 


5/17/17 10:58  69      


 


5/17/17 09:53  72      


 


5/17/17 07:37  70      


 


5/17/17 07:37 97.8 70 18 139/72 96   


 


5/17/17 06:00  77      


 


5/17/17 05:00  62      


 


5/17/17 04:10  70      


 


5/17/17 03:20  68      


 


5/17/17 03:10 97.5 65 18 129/74 97   


 


5/17/17 02:21  69      


 


5/17/17 01:00  68      


 


5/17/17 00:00  76      


 


5/16/17 23:33 97.7 66 18 140/68 96   


 


5/16/17 23:00  64      


 


5/16/17 22:00  88      


 


5/16/17 21:00  68      


 


5/16/17 20:09 97.6 71 20 130/57 94   


 


5/16/17 20:09  78      


 


5/16/17 18:01  71      


 


5/16/17 17:01  66      


 


5/16/17 16:00  78      


 


5/16/17 16:00 97.6 64 18 153/88 97   


 


5/16/17 14:00  62      








 I/O








 5/16/17 5/16/17 5/16/17 5/17/17 5/17/17 5/17/17





 07:00 15:00 23:00 07:00 15:00 23:00


 


Intake Total 350 ml  94 ml 860 ml  


 


Output Total 800 ml  350 ml 1050 ml  


 


Balance -450 ml  -256 ml -190 ml  


 


      


 


Intake Oral 240 ml  0 ml 360 ml  


 


IV Total 110 ml  94 ml 500 ml  


 


Output Urine Total 800 ml  350 ml 1050 ml  


 


# Voids   2   


 


# Bowel Movements   0 0  








Result Diagram:  


5/16/17 0356                                                                   

             5/13/17 2310








ROS


General:  Other (10 oint ROS done, positives noted)


HEENT:  Other (gerd)


Pulmonary:  Cough (occ.)


GI:  BM (uses exlax, wants to take when she get home)





Physical Exam


Physical Exam


PHYSICAL EXAMINATION


GENERAL:  This is an obese, well-nourished   female 


who appears to be in no acute distress.  She  is alert 


HEAD:  Normocephalic without any lesion or mass noted.  Facial features


appear symmetric.


OROPHARYNGEAL:  Oropharynx without erythema or edema.


NECK:  Supple.  No nuchal rigidity or lymphadenopathy.


Trachea midline without deviation.  


CARDIAC:  Regular rhythm, regular rate, S1 and S2 are heard. 


LUNGS:  Clear to auscultation bilaterally.


ABDOMEN:  Soft, nontender, no organomegaly or masses.  Bowel sounds are


heard in all four quadrants.  No rebound.  No guarding.


EXTREMITIES:  no edema.  Pulses equal bilateral. 


NEUROLOGICAL:  Patient mood and affect appropriate. No focal deficit


SKIN:Warm and moist


Objective Remarks


Im ready to go home.





A/P


Assessment and Plan


1) Chest pain


(2) Non-ST elevation MI (NSTEMI)


(3) HTN (hypertension)


(4) Dyspnea on exertion


(5) Hyperlipemia


(6) Snuff user


(7) COPD (chronic obstructive pulmonary disease)


8. GERD





Chest pain, MI R/O


Cardiac consult done,appreciate


Heart catheter done, normal arteries


rm air now





GERD, stay on pepcid BID at home





COPD, stable, 


DuoNeb's when necessary for any wheezing





Hypertension, stable


Continue home medications





Hyperlipidemia


Medical management





Bowel regimen, home ex lax





DC planning today, stable for DC








Discussed with nurse


Discussed with patient


Discussed with Dr. Valdez, patient seen on his behalf








Leigh Ann Kruse May 17, 2017 13:21

## 2017-10-14 ENCOUNTER — HOSPITAL ENCOUNTER (EMERGENCY)
Dept: HOSPITAL 17 - NEPC | Age: 82
Discharge: HOME | End: 2017-10-14
Payer: MEDICARE

## 2017-10-14 VITALS
RESPIRATION RATE: 16 BRPM | HEART RATE: 93 BPM | TEMPERATURE: 97.6 F | OXYGEN SATURATION: 96 % | DIASTOLIC BLOOD PRESSURE: 85 MMHG | SYSTOLIC BLOOD PRESSURE: 197 MMHG

## 2017-10-14 VITALS — WEIGHT: 187.39 LBS | HEIGHT: 62 IN | BODY MASS INDEX: 34.48 KG/M2

## 2017-10-14 DIAGNOSIS — Z79.01: ICD-10-CM

## 2017-10-14 DIAGNOSIS — I10: ICD-10-CM

## 2017-10-14 DIAGNOSIS — Z79.82: ICD-10-CM

## 2017-10-14 DIAGNOSIS — I25.2: ICD-10-CM

## 2017-10-14 DIAGNOSIS — J44.9: ICD-10-CM

## 2017-10-14 DIAGNOSIS — Z79.899: ICD-10-CM

## 2017-10-14 DIAGNOSIS — M54.6: Primary | ICD-10-CM

## 2017-10-14 PROCEDURE — 71010: CPT

## 2017-10-14 PROCEDURE — 93005 ELECTROCARDIOGRAM TRACING: CPT

## 2017-10-14 PROCEDURE — 99284 EMERGENCY DEPT VISIT MOD MDM: CPT

## 2017-10-14 NOTE — PD
HPI


Chief Complaint:  Back/ Neck Pain or Injury


Time Seen by Provider:  20:24


Travel History


International Travel<30 days:  No


Contact w/Intl Traveler<30days:  No


Traveled to known affect area:  No





History of Present Illness


HPI


81-year-old female complains of right upper back pain.  Patient states that she 

was getting up this morning started having sharp pain right upper back area 

with radiation to right shoulder.  Patient states the pain is worse with deep 

breathing or movement.  Patient denies any recent fall.  Patient denies any 

headache.  Patient denies any neck pain.  Patient denies any anterior chest 

pain.  Patient denies any shortness of breath.  Patient denies abdominal pain.  

Patient denies any focal weakness or numbness of extremity.  Patient denies 

coughing congestion fever chills.





PFSH


Past Medical History


Hx Anticoagulant Therapy:  Yes


Arthritis:  Yes


Asthma:  No


Autoimmune Disease:  No


Blood Disorders:  No


Anxiety:  No


Depression:  No


Heart Rhythm Problems:  No


Cancer:  Yes (LEFT BREAST)


Cardiovascular Problems:  Yes (stent 16)


High Cholesterol:  No


Chemotherapy:  No


Chest Pain:  Yes


Congestive Heart Failure:  No


COPD:  Yes


Diabetes:  No


Diminished Hearing:  No


Endocrine:  No


Gastrointestinal Disorders:  Yes


Genitourinary:  Yes


Hypertension:  Yes


Immune Disorder:  No


Implanted Vascular Access Dvce:  Yes (cardiac stent)


Kidney Stones:  Yes


Musculoskeletal:  Yes


Neurologic:  No


Psychiatric:  No


Reproductive:  Yes (TOTAL HYSTERECTOMY)


Respiratory:  Yes


Immunizations Current:  No


Myocardial Infarction:  Yes ("SMALL MI YEARS AGO")


Radiation Therapy:  Yes


Sleep Apnea:  No


Thyroid Disease:  No


Tetanus Vaccination:  < 5 Years


Influenza Vaccination:  No


Pregnant?:  Not Pregnant


Menopausal:  Yes


:  13


Para:  12


Miscarriage:  1


Dilation and Curettage (D&C):  Yes





Past Surgical History


Abdominal Surgery:  No


AICD:  No


Arteriovenous Shunt:  No


Body Medical Devices:  cardiac stent, bliateral lens implants


Cardiac Surgery:  Yes (angioplasty 16)


Ear Surgery:  No


Endocrine Surgery:  No


Eye Surgery:  Yes (BL CATARACT REMOVAL, BL LENSE IMPLANT)


Genitourinary Surgery:  No


Gynecologic Surgery:  Yes (TOTAL HYSTERECTOMY)


Hysterectomy:  Yes


Insulin Pump:  No


Joint Replacement:  No


Mastectomy:  Yes (L MASTECTOMY)


Neurologic Surgery:  No


Oral Surgery:  No


Pacemaker:  No


Thoracic Surgery:  Yes


Other Surgery:  Yes (L PARTIAL MASTECTOMY)





Social History


Alcohol Use:  No


Tobacco Use:  No (uses snuff)


Substance Use:  No





Allergies-Medications


(Allergen,Severity, Reaction):  


Coded Allergies:  


     lisinopril (Unverified  Allergy, Mild, COUGH, 8/15/17)


Reported Meds & Prescriptions





Reported Meds & Active Scripts


Active


Reported


Ranitidine (Ranitidine HCl) 150 Mg Cap 150 Mg PO DAILY


Potassium Chloride CR (Potassium Chloride) 10 Meq Tab 20 Meq PO BID


Nitroglycerin SL (Nitroglycerin) 0.3 Mg Subl 0.3 Mg SL AS DIRECTED PRN


     ONE TABLET UNDER THE TONGUE AS NEEDED FOR CHEST PAIN, MAY REPEAT EVERY


     FIVE MINUTES FOR A TOTAL OF 3 DOSES OR CALL 911 IF NO RELIEF


Metoprolol Tartrate 25 Mg Tab 25 Mg PO BID


Losartan (Losartan Potassium) 100 Mg Tab 100 Mg PO DAILY


Isosorbide Mononitrate 10 Mg Tab 30 Mg PO DAILY


     Take 2 doses 7 hours apart.


[hydrodiuril]   25 Mg PO DAILY


Plavix (Clopidogrel Bisulfate) 75 Mg Tab 75 Mg PO DAILY


Lipitor (Atorvastatin Calcium) 80 Mg Tab 80 Mg PO HS


Aspirin 81 Mg Chew 81 Mg CHEW DAILY








Review of Systems


General / Constitutional:  No: Fever


Eyes:  No: Visual changes


HENT:  No: Headaches


Cardiovascular:  No: Chest Pain or Discomfort


Respiratory:  No: Shortness of Breath


Gastrointestinal:  No: Abdominal Pain


Genitourinary:  No: Dysuria


Musculoskeletal:  No: Pain


Skin:  No Rash


Neurologic:  No: Weakness


Psychiatric:  No: Depression


Endocrine:  No: Polydipsia


Hematologic/Lymphatic:  No: Easy Bruising





Physical Exam


Narrative


GENERAL: Well-nourished, well-developed patient.


SKIN: Focused skin assessment warm/dry.


HEAD: Normocephalic.


EYES: No scleral icterus. No injection or drainage. 


NECK: Supple, trachea midline. No JVD or lymphadenopathy.


CARDIOVASCULAR: Regular rate and rhythm without murmurs, gallops, or rubs. 


RESPIRATORY: Breath sounds equal bilaterally. No accessory muscle use.


GASTROINTESTINAL: Abdomen soft, non-tender, nondistended. 


MUSCULOSKELETAL: No cyanosis, or edema. 


BACK: Moderate tenderness to palpation right upper back area, without obvious 

deformity. No CVA tenderness.  No rash noted.  No redness or swelling of the 

skin.





Data


Data


Last Documented VS





Vital Signs








  Date Time  Temp Pulse Resp B/P (MAP) Pulse Ox O2 Delivery O2 Flow Rate FiO2


 


10/14/17 20:09 97.6 93 16 197/85 (122) 96 Room Air  








Orders





 Orders


Electrocardiogram (10/14/17 20:41)


Chest, Single Ap (10/14/17 20:41)


Acetamin-Codeine 300-30 Mg (Tylenol-Code (10/14/17 21:30)








MDM


Medical Decision Making


Medical Screen Exam Complete:  Yes


Emergency Medical Condition:  Yes


Interpretation(s)


2048 PM.  EKG shows sinus rhythm nonspecific ST-T wave changes.  Unchanged from 

previous EKG.


21:20 PM.  Chest x-ray shows no acute consolidation.


Differential Diagnosis


Differential diagnosis including musculoskeletal, fractured rib, 

hemopneumothorax, MI.


Narrative Course


81-year-old female with right upper back pain.  Tylenol with codeine, one 

tablet by mouth given.





Diagnosis





 Primary Impression:  


 Back pain


 Qualified Codes:  M54.6 - Pain in thoracic spine


Patient Instructions:  General Instructions





***Additional Instructions:  


Upright moist heat to the back.  Tylenol with codeine for pain.  Follow-up with 

personal physician.  Return if worse.  Take stool softener with pain medication.


***Med/Other Pt SpecificInfo:  Prescription(s) given


Scripts


Acetaminophen-Codeine (Tylenol-Codeine #3) 300-30 mg Tab


1-2 TAB PO Q6H Y for PAIN, #30 TAB 0 Refills


   Prov: Jose Francisco Link MD         10/14/17


Disposition:  01 DISCHARGE HOME


Condition:  Stable











Jose Francisco Link MD Oct 14, 2017 20:47

## 2017-10-14 NOTE — RADRPT
EXAM DATE/TIME:  10/14/2017 20:57 

 

[facility] COMPARISON:     

CHEST SINGLE AP, May 13, 2017, 23:12.

 

                     

INDICATIONS :     

Chest pain.

                     

 

MEDICAL HISTORY :            

Hypertension. Myocardial infarction. Carcinoma, breast. COPD   

 

SURGICAL HISTORY :        

Mastectomy, left.

 

ENCOUNTER:     

Initial                                        

 

ACUITY:     

1 day      

 

PAIN SCORE:     

1/10

 

LOCATION:     

Bilateral chest 

 

FINDINGS:     

A single view of the chest demonstrates the lungs to be symmetrically aerated without evidence of mas
s, infiltrate or effusion.  The cardiomediastinal contours are unremarkable.  Osseous structures are 
intact.

 

CONCLUSION:     No acute disease.  

 

 

 

 Cuate Mo MD on October 14, 2017 at 21:14           

Board Certified Radiologist.

 This report was verified electronically.

## 2017-10-15 NOTE — EKG
Date Performed: 10/14/2017       Time Performed: 20:44:13

 

PTAGE:      81 years

 

EKG:      Sinus rhythm 

 

 PROBABLE INFERIOR MYOCARDIAL INFARCTION Nonspecific T wave changes No significant change from prior 
electrocardiogram. 

 

NO PREVIOUS TRACING            

 

DOCTOR:   Man Ace  Interpretating Date/Time  10/15/2017 07:39:51

## 2018-01-27 ENCOUNTER — HOSPITAL ENCOUNTER (OUTPATIENT)
Dept: HOSPITAL 17 - NEPC | Age: 83
Setting detail: OBSERVATION
LOS: 2 days | Discharge: HOME | End: 2018-01-29
Attending: HOSPITALIST | Admitting: HOSPITALIST
Payer: MEDICARE

## 2018-01-27 VITALS
OXYGEN SATURATION: 97 % | DIASTOLIC BLOOD PRESSURE: 80 MMHG | RESPIRATION RATE: 20 BRPM | SYSTOLIC BLOOD PRESSURE: 168 MMHG | TEMPERATURE: 97.4 F | HEART RATE: 72 BPM

## 2018-01-27 VITALS
TEMPERATURE: 97.6 F | SYSTOLIC BLOOD PRESSURE: 168 MMHG | DIASTOLIC BLOOD PRESSURE: 78 MMHG | RESPIRATION RATE: 18 BRPM | HEART RATE: 75 BPM | OXYGEN SATURATION: 97 %

## 2018-01-27 VITALS
HEART RATE: 75 BPM | OXYGEN SATURATION: 97 % | DIASTOLIC BLOOD PRESSURE: 88 MMHG | RESPIRATION RATE: 16 BRPM | SYSTOLIC BLOOD PRESSURE: 156 MMHG

## 2018-01-27 VITALS
OXYGEN SATURATION: 97 % | RESPIRATION RATE: 18 BRPM | DIASTOLIC BLOOD PRESSURE: 83 MMHG | SYSTOLIC BLOOD PRESSURE: 148 MMHG | HEART RATE: 75 BPM

## 2018-01-27 VITALS
OXYGEN SATURATION: 98 % | SYSTOLIC BLOOD PRESSURE: 229 MMHG | HEART RATE: 80 BPM | DIASTOLIC BLOOD PRESSURE: 102 MMHG | RESPIRATION RATE: 18 BRPM | TEMPERATURE: 97.6 F

## 2018-01-27 VITALS
SYSTOLIC BLOOD PRESSURE: 156 MMHG | HEART RATE: 74 BPM | RESPIRATION RATE: 20 BRPM | OXYGEN SATURATION: 96 % | DIASTOLIC BLOOD PRESSURE: 74 MMHG | TEMPERATURE: 97.5 F

## 2018-01-27 VITALS
RESPIRATION RATE: 16 BRPM | SYSTOLIC BLOOD PRESSURE: 161 MMHG | OXYGEN SATURATION: 97 % | HEART RATE: 69 BPM | DIASTOLIC BLOOD PRESSURE: 94 MMHG

## 2018-01-27 VITALS
HEART RATE: 85 BPM | RESPIRATION RATE: 18 BRPM | TEMPERATURE: 97.5 F | OXYGEN SATURATION: 97 % | SYSTOLIC BLOOD PRESSURE: 214 MMHG | DIASTOLIC BLOOD PRESSURE: 160 MMHG

## 2018-01-27 VITALS — HEIGHT: 60 IN | BODY MASS INDEX: 39.69 KG/M2 | WEIGHT: 202.16 LBS

## 2018-01-27 VITALS
DIASTOLIC BLOOD PRESSURE: 68 MMHG | RESPIRATION RATE: 20 BRPM | HEART RATE: 67 BPM | TEMPERATURE: 97.2 F | SYSTOLIC BLOOD PRESSURE: 164 MMHG | OXYGEN SATURATION: 98 %

## 2018-01-27 VITALS
TEMPERATURE: 97.9 F | OXYGEN SATURATION: 96 % | HEART RATE: 77 BPM | SYSTOLIC BLOOD PRESSURE: 160 MMHG | RESPIRATION RATE: 20 BRPM | DIASTOLIC BLOOD PRESSURE: 80 MMHG

## 2018-01-27 VITALS
RESPIRATION RATE: 16 BRPM | HEART RATE: 77 BPM | SYSTOLIC BLOOD PRESSURE: 165 MMHG | DIASTOLIC BLOOD PRESSURE: 80 MMHG | OXYGEN SATURATION: 98 %

## 2018-01-27 VITALS — HEART RATE: 67 BPM

## 2018-01-27 DIAGNOSIS — E78.5: ICD-10-CM

## 2018-01-27 DIAGNOSIS — R07.89: ICD-10-CM

## 2018-01-27 DIAGNOSIS — R06.02: ICD-10-CM

## 2018-01-27 DIAGNOSIS — I25.10: ICD-10-CM

## 2018-01-27 DIAGNOSIS — I16.1: Primary | ICD-10-CM

## 2018-01-27 DIAGNOSIS — I11.9: ICD-10-CM

## 2018-01-27 DIAGNOSIS — K21.9: ICD-10-CM

## 2018-01-27 DIAGNOSIS — Z95.5: ICD-10-CM

## 2018-01-27 DIAGNOSIS — R74.8: ICD-10-CM

## 2018-01-27 DIAGNOSIS — E66.9: ICD-10-CM

## 2018-01-27 DIAGNOSIS — Z85.3: ICD-10-CM

## 2018-01-27 DIAGNOSIS — Z91.14: ICD-10-CM

## 2018-01-27 DIAGNOSIS — R94.31: ICD-10-CM

## 2018-01-27 LAB
BASOPHILS # BLD AUTO: 0.1 TH/MM3 (ref 0–0.2)
BASOPHILS NFR BLD: 0.9 % (ref 0–2)
BUN SERPL-MCNC: 14 MG/DL (ref 7–18)
CALCIUM SERPL-MCNC: 9.1 MG/DL (ref 8.5–10.1)
CHLORIDE SERPL-SCNC: 104 MEQ/L (ref 98–107)
CREAT SERPL-MCNC: 0.89 MG/DL (ref 0.5–1)
EOSINOPHIL # BLD: 0.5 TH/MM3 (ref 0–0.4)
EOSINOPHIL NFR BLD: 5 % (ref 0–4)
ERYTHROCYTE [DISTWIDTH] IN BLOOD BY AUTOMATED COUNT: 13.9 % (ref 11.6–17.2)
GFR SERPLBLD BASED ON 1.73 SQ M-ARVRAT: 61 ML/MIN (ref 89–?)
GLUCOSE SERPL-MCNC: 104 MG/DL (ref 74–106)
HCO3 BLD-SCNC: 30.1 MEQ/L (ref 21–32)
HCT VFR BLD CALC: 40.6 % (ref 35–46)
HGB BLD-MCNC: 13.9 GM/DL (ref 11.6–15.3)
INR PPP: 1 RATIO
LYMPHOCYTES # BLD AUTO: 2.4 TH/MM3 (ref 1–4.8)
LYMPHOCYTES NFR BLD AUTO: 26 % (ref 9–44)
MAGNESIUM SERPL-MCNC: 2.2 MG/DL (ref 1.5–2.5)
MCH RBC QN AUTO: 30.6 PG (ref 27–34)
MCHC RBC AUTO-ENTMCNC: 34.3 % (ref 32–36)
MCV RBC AUTO: 89.4 FL (ref 80–100)
MONOCYTE #: 0.8 TH/MM3 (ref 0–0.9)
MONOCYTES NFR BLD: 8.8 % (ref 0–8)
NEUTROPHILS # BLD AUTO: 5.5 TH/MM3 (ref 1.8–7.7)
NEUTROPHILS NFR BLD AUTO: 59.3 % (ref 16–70)
PLATELET # BLD: 227 TH/MM3 (ref 150–450)
PMV BLD AUTO: 7.9 FL (ref 7–11)
PROTHROMBIN TIME: 10.3 SEC (ref 9.8–11.6)
RBC # BLD AUTO: 4.54 MIL/MM3 (ref 4–5.3)
SODIUM SERPL-SCNC: 138 MEQ/L (ref 136–145)
TROPONIN I SERPL-MCNC: 0.07 NG/ML (ref 0.02–0.05)
WBC # BLD AUTO: 9.3 TH/MM3 (ref 4–11)

## 2018-01-27 PROCEDURE — 85610 PROTHROMBIN TIME: CPT

## 2018-01-27 PROCEDURE — G0378 HOSPITAL OBSERVATION PER HR: HCPCS

## 2018-01-27 PROCEDURE — 96372 THER/PROPH/DIAG INJ SC/IM: CPT

## 2018-01-27 PROCEDURE — 83735 ASSAY OF MAGNESIUM: CPT

## 2018-01-27 PROCEDURE — 85730 THROMBOPLASTIN TIME PARTIAL: CPT

## 2018-01-27 PROCEDURE — 80053 COMPREHEN METABOLIC PANEL: CPT

## 2018-01-27 PROCEDURE — 99291 CRITICAL CARE FIRST HOUR: CPT

## 2018-01-27 PROCEDURE — 71045 X-RAY EXAM CHEST 1 VIEW: CPT

## 2018-01-27 PROCEDURE — 84484 ASSAY OF TROPONIN QUANT: CPT

## 2018-01-27 PROCEDURE — 82550 ASSAY OF CK (CPK): CPT

## 2018-01-27 PROCEDURE — 80048 BASIC METABOLIC PNL TOTAL CA: CPT

## 2018-01-27 PROCEDURE — 93005 ELECTROCARDIOGRAM TRACING: CPT

## 2018-01-27 PROCEDURE — 96361 HYDRATE IV INFUSION ADD-ON: CPT

## 2018-01-27 PROCEDURE — 82552 ASSAY OF CPK IN BLOOD: CPT

## 2018-01-27 PROCEDURE — 96374 THER/PROPH/DIAG INJ IV PUSH: CPT

## 2018-01-27 PROCEDURE — 85025 COMPLETE CBC W/AUTO DIFF WBC: CPT

## 2018-01-27 RX ADMIN — ISOSORBIDE MONONITRATE SCH MG: 30 TABLET, EXTENDED RELEASE ORAL at 08:37

## 2018-01-27 RX ADMIN — METOPROLOL TARTRATE SCH MG: 1 INJECTION, SOLUTION INTRAVENOUS at 02:05

## 2018-01-27 RX ADMIN — PHENYTOIN SODIUM SCH MLS/HR: 50 INJECTION INTRAMUSCULAR; INTRAVENOUS at 03:54

## 2018-01-27 RX ADMIN — ATORVASTATIN CALCIUM SCH MG: 80 TABLET, FILM COATED ORAL at 22:14

## 2018-01-27 RX ADMIN — FAMOTIDINE SCH MG: 20 TABLET, FILM COATED ORAL at 22:14

## 2018-01-27 RX ADMIN — PHENYTOIN SODIUM SCH MLS/HR: 50 INJECTION INTRAMUSCULAR; INTRAVENOUS at 12:49

## 2018-01-27 RX ADMIN — Medication SCH ML: at 08:37

## 2018-01-27 RX ADMIN — Medication SCH ML: at 21:00

## 2018-01-27 RX ADMIN — LOSARTAN POTASSIUM SCH MG: 50 TABLET, FILM COATED ORAL at 08:37

## 2018-01-27 RX ADMIN — NITROGLYCERIN SCH MG: 0.4 TABLET SUBLINGUAL at 02:05

## 2018-01-27 RX ADMIN — ASPIRIN 81 MG SCH MG: 81 TABLET ORAL at 08:37

## 2018-01-27 RX ADMIN — METOPROLOL TARTRATE SCH MG: 25 TABLET, FILM COATED ORAL at 22:13

## 2018-01-27 RX ADMIN — METOPROLOL TARTRATE SCH MG: 25 TABLET, FILM COATED ORAL at 08:37

## 2018-01-27 RX ADMIN — METOPROLOL TARTRATE SCH MG: 1 INJECTION, SOLUTION INTRAVENOUS at 02:10

## 2018-01-27 RX ADMIN — CLOPIDOGREL BISULFATE SCH MG: 75 TABLET, FILM COATED ORAL at 08:36

## 2018-01-27 RX ADMIN — PHENYTOIN SODIUM SCH MLS/HR: 50 INJECTION INTRAMUSCULAR; INTRAVENOUS at 22:14

## 2018-01-27 RX ADMIN — STANDARDIZED SENNA CONCENTRATE AND DOCUSATE SODIUM SCH TAB: 8.6; 5 TABLET, FILM COATED ORAL at 08:36

## 2018-01-27 RX ADMIN — METOPROLOL TARTRATE SCH MG: 1 INJECTION, SOLUTION INTRAVENOUS at 02:00

## 2018-01-27 RX ADMIN — STANDARDIZED SENNA CONCENTRATE AND DOCUSATE SODIUM SCH TAB: 8.6; 5 TABLET, FILM COATED ORAL at 22:14

## 2018-01-27 RX ADMIN — NITROGLYCERIN SCH MG: 0.4 TABLET SUBLINGUAL at 02:13

## 2018-01-27 RX ADMIN — NITROGLYCERIN SCH MG: 0.4 TABLET SUBLINGUAL at 02:10

## 2018-01-27 NOTE — MB
cc:

KAYLEIGH SUMNER MD

****

 

 

DATE OF CONSULTATION

1/27/18

 

HISTORY OF PRESENT ILLNESS

Ms. Trevino is an 82 year old white female with a history of hypertension,

coronary artery disease.  She presented with  acute substernal chest discomfort

radiating into her neck.  She has not been taking her medications for  the last

two weeks.  Her last catheterization in 5/2017 showed mild to moderate coronary

disease and patent stent in the mid LAD.  The patient is pain free today and

is feeling better.  She was severely hypertensive on admission with systolic

blood pressure of 240.

 

PAST MEDICAL HISTORY

1.  Hypertension,

2.  Breast cancer,

3.  Dyslipidemia

4.  Coronary artery disease and mid LAD stenting with patent stent on the last

cardiac catheterization in 5/2017.

5.  History of cardiac surgery

6.  Hysterectomy

7.  Left mastectomy.

 

ALLERGIES

LISINOPRIL

 

SOCIAL HISTORY

She does not smoke, does not drink alcohol.

 

FAMILY HISTORY

Negative for heart disease.

 

MEDICATIONS

1.  Plavix

2.  Atorvastatin

3.  Isosorbide mononitrate.

4.  Nitroglycerin p.r.n.

5.  Metoprolol.

6.  Start on baby aspirin.

7.  Tylenol.

8.  Codeine

9.  Potassium chloride.

10.  Zantac

11.  HydroDIURIL.

 

PHYSICAL EXAMINATION

VITAL SIGNS:  Blood pressure  153/74, pulse 74 and regular.  

HEENT:  Negative.

2+ carotid upstrokes.  No bruits.

LUNGS:   Clear.

HEART:  Regular with no murmur, gallop or rub.

ABDOMEN:  Soft. No bruits.

EXTREMITIES:  Without edema 1-2+ distal pulses.

NEUROLOGIC: Grossly nonfocal.

 

CARDIOLOGY STUDIES

EKG was reviewed and showed normal sinus rhythm.  Nonspecific ST-T changes.

This is unchanged from the previous EKG.

 

LABORATORY DATA

Hemoglobin 13.9, potassium 4.2, creatinine 0.9, troponin 0.07.  And 0.06.

CK-MB index normal.

 

DIAGNOSES

1.  Chest pain.

2.  Mildly abnormal troponin.

3.  Severe hypertension.

4.  Coronary artery disease, history of LAD stenting.

5.  Dyslipidemia.

 

DISPOSITION

Ms. Trevino  will be monitored on telemetry.  Her chest pain is now resolved.

She has slight troponin elevation which has not been trending in either

direction.  This may be related to her severely elevated blood pressure on

admission.  This is likely related to her recent noncompliance with her medical

care.  I  recommend to continue and titrate her antihypertensive medications.

She will be monitored on telemetry.  I will follow her for cardiology during

her hospitalization.  I will also see her back for followup in our office

after discharge.

 

 

                              _________________________________

                              MD KAMI Layne/

D:  1/27/2018/4:44 PM

T:  1/27/2018/8:32 PM

Visit #:  [id number]

Job #:  [id number]

NICK

## 2018-01-27 NOTE — RADRPT
EXAM DATE/TIME:  01/27/2018 01:32 

 

[facility] COMPARISON:     

CHEST SINGLE AP, October 14, 2017, 20:57.

 

                     

INDICATIONS :     

Chest pain.

                     

 

MEDICAL HISTORY :     

Hypertension.  Chronic obstructive pulmonary disease.     Myocardial infarction. Carcinoma, breast.  
 

 

SURGICAL HISTORY :        

Mastectomy, left.

 

ENCOUNTER:     

Initial                                        

 

ACUITY:     

1 day      

 

PAIN SCORE:     

8/10

 

LOCATION:     

Bilateral chest 

 

FINDINGS:     

The heart size is enlarged. The lungs are clear. No effusion is seen. Clips are seen in the lower lef
t axillary region. Surgical fasteners are seen at the proximal left numerous.

 

CONCLUSION:     Cardiomegaly.

 

 

 

 Richardson Wong MD on January 27, 2018 at 1:45           

Board Certified Radiologist.

 This report was verified electronically.

## 2018-01-27 NOTE — HHI.PR
Subjective


Remarks


This is a pleasant 83 y/o Female with hypertension, Breast Cancer, 

hyperlipidemia, CAD who came to ER with Chest pain


S/p Cardiac Cath 5/16/17 by Dr. Kaur w/ mild to moderate CAD, patent stent 

mid LAD w/ recommendation for medical therapy.  


has status post CABG.





Patient was admitted today, discussed with doctor Vincent will give 

recommendations, no nausea, vomit or diarrhea. has Chest pain


Oppressive sensation but troponin trending down.





Objective





Vital Signs








  Date Time  Temp Pulse Resp B/P (MAP) Pulse Ox O2 Delivery O2 Flow Rate FiO2


 


1/27/18 05:18      Room Air  


 


1/27/18 05:00  67      


 


1/27/18 04:36        


 


1/27/18 04:30 97.2 67 20 164/68 (100) 98   


 


1/27/18 04:09  69 16 161/94 (116) 97   


 


1/27/18 02:45  75 16 156/88 (110) 97   


 


1/27/18 02:30  75 18 148/83 (104) 97   


 


1/27/18 02:00  77 16 165/80 (108) 98   


 


1/27/18 01:21  78      


 


1/27/18 01:20 97.6 80 18 229/102 (144) 98   


 


1/27/18 01:12 97.5 85 18 214/160 (178) 97 Room Air  








Result Diagram:  


1/27/18 0131                                                                   

             1/27/18 0131





Imaging





Last Impressions








Chest X-Ray 1/27/18 0125 Signed





Impressions: 





 Service Date/Time:  Saturday, January 27, 2018 01:32 - CONCLUSION: 

Cardiomegaly. 





     Richardson Wong MD 








Procedures


None


Other Results





Laboratory Tests








Test


  1/27/18


01:31


 


White Blood Count 9.3 TH/MM3 


 


Red Blood Count 4.54 MIL/MM3 


 


Hemoglobin 13.9 GM/DL 


 


Hematocrit 40.6 % 


 


Mean Corpuscular Volume 89.4 FL 


 


Mean Corpuscular Hemoglobin 30.6 PG 


 


Mean Corpuscular Hemoglobin


Concent 34.3 % 


 


 


Red Cell Distribution Width 13.9 % 


 


Platelet Count 227 TH/MM3 


 


Mean Platelet Volume 7.9 FL 


 


Neutrophils (%) (Auto) 59.3 % 


 


Lymphocytes (%) (Auto) 26.0 % 


 


Monocytes (%) (Auto) 8.8 % 


 


Eosinophils (%) (Auto) 5.0 % 


 


Basophils (%) (Auto) 0.9 % 


 


Neutrophils # (Auto) 5.5 TH/MM3 


 


Lymphocytes # (Auto) 2.4 TH/MM3 


 


Monocytes # (Auto) 0.8 TH/MM3 


 


Eosinophils # (Auto) 0.5 TH/MM3 


 


Basophils # (Auto) 0.1 TH/MM3 


 


CBC Comment DIFF FINAL 


 


Differential Comment  


 


Prothrombin Time 10.3 SEC 


 


Prothromb Time International


Ratio 1.0 RATIO 


 


 


Activated Partial


Thromboplast Time 26.4 SEC 


 


 


Blood Urea Nitrogen 14 MG/DL 


 


Creatinine 0.89 MG/DL 


 


Random Glucose 104 MG/DL 


 


Calcium Level 9.1 MG/DL 


 


Magnesium Level 2.2 MG/DL 


 


Sodium Level 138 MEQ/L 


 


Potassium Level 4.2 MEQ/L 


 


Chloride Level 104 MEQ/L 


 


Carbon Dioxide Level 30.1 MEQ/L 


 


Anion Gap 4 MEQ/L 


 


Estimat Glomerular Filtration


Rate 61 ML/MIN 


 


 


Total Creatine Kinase 388 U/L 


 


Creatine Kinase MB 4.9 NG/ML 


 


Creatine Kinase MB % 1.3 % 


 


Troponin I 0.07 NG/ML 








Objective Remarks


GENERAL: Pleasant elderly white female in no acute distress.  Daughter bedside


HEENT: PERRLA, EOMI. No scleral icterus or conjunctival pallor. No lid lag or 

facial droop.  


CARDIOVASCULAR: Regular rate and rhythm.  No obvious murmurs to auscultation. 

No chest tenderness to palpation. 


RESPIRATORY: No obvious rhonchi or wheezing. Clear to auscultation. Breath 

sounds equal bilaterally. 


GASTROINTESTINAL: Abdomen soft, non-tender, nondistended. BS normal. 


MUSCULOSKELETAL: Extremities without clubbing, cyanosis, or edema. No obvious 

deformities. 


NEUROLOGICAL: Awake, alert and oriented x4. No focal neurologic deficits. 

Moving both upper and lower extremities spontaneously.





Medications and IVs





Current Medications








 Medications


  (Trade)  Dose


 Ordered  Sig/Kaitlin


 Route  Start Time


 Stop Time Status Last Admin


 


 Sodium Chloride  1,000 ml @ 


 100 mls/hr  Q10H


 IV  1/27/18 02:49


    1/27/18 03:54


 


 


  (NS Flush)  2 ml  UNSCH  PRN


 IV FLUSH  1/27/18 03:00


     


 


 


  (NS Flush)  2 ml  BID


 IV FLUSH  1/27/18 09:00


    1/27/18 08:37


 


 


  (Zofran Inj)  4 mg  Q6H  PRN


 IVP  1/27/18 03:00


     


 


 


  (Tylenol)  650 mg  Q6H  PRN


 PO  1/27/18 03:00


     


 


 


  (Norco  5-325 Mg)  1 tab  Q4H  PRN


 PO  1/27/18 03:00


     


 


 


  (Morphine Inj)  2 mg  Q3H  PRN


 IV PUSH  1/27/18 03:00


     


 


 


  (Juanita-Colace)  1 tab  BID


 PO  1/27/18 09:00


    1/27/18 08:36


 


 


  (Milk Of


 Magnesia Liq)  30 ml  Q12H  PRN


 PO  1/27/18 03:00


     


 


 


  (Senokot)  17.2 mg  Q12H  PRN


 PO  1/27/18 03:00


     


 


 


  (Dulcolax Supp)  10 mg  DAILY  PRN


 RECTAL  1/27/18 03:00


     


 


 


  (Lactulose Liq)  30 ml  DAILY  PRN


 PO  1/27/18 03:00


     


 


 


  (Aspirin Chew)  81 mg  DAILY


 CHEW  1/27/18 09:00


    1/27/18 08:37


 


 


  (Lipitor)  80 mg  HS


 PO  1/27/18 21:00


     


 


 


  (Plavix)  75 mg  DAILY


 PO  1/27/18 09:00


    1/27/18 08:36


 


 


  (Imdur)  30 mg  DAILY


 PO  1/27/18 09:00


    1/27/18 08:37


 


 


  (Cozaar)  100 mg  DAILY


 PO  1/27/18 09:00


    1/27/18 08:37


 


 


  (Lopressor)  25 mg  BID


 PO  1/27/18 09:00


    1/27/18 08:37


 


 


  (Nitroglycerin


 2% Oint)  0.5 inch  Q6HR  PRN


 TOPICAL  1/27/18 03:00


     


 


 


  (Pepcid)  10 mg  BID


 PO  1/27/18 21:00


     


 











A/P


Assessment and Plan


(1) Chest pain


ICD Code:  R07.9 - Chest pain


Status:  Acute


(2) Elevated troponin


ICD Code:  R74.8 - Elevated troponin level


Status:  Acute


(3) HTN (hypertension)


ICD Code:  I10 - Essential (primary) hypertension


Status:  Acute





1.  Chest Pain:  acute onset of chest pain, substernal, occasional radiation, s/

p NTG/Morphine in ER w/ improvement, currently chest pain free.  Will admit for 

Observation, place on Telemetry.  Continue NTG/Morphine as needed.  CXR w/ 

cardiomegaly, Troponin trending down, now 0.06, discussed with her Primary 

Cardiology specialist doctor Vincent will see the patient . 


2.  Elevated Trop:  Trop 0.07, h/o CAD, r/o ACS w/ ischemia.  Pt non-compliant w

/ Plavix for approx 2 wks, will resume home ASA, Plavix.  Check serial cardiac 

enzymes for trend, B-Blocker, Statin. 


3.  HTN:  Uncontrolled.  Secondary to non-compliance w/ medication.  S/p 

Metoprolol in ER w/ improvement.  Resume home medications, monitor BP closely. 


4.  Obesity strongly recommended diet and exercise as outpatient. 











DVT Prophylaxis:  SCD/Teds. 


Social work for d/c planning as needed. 


discussed with patient and her Daughter in the room.


Discharge Planning


Once cleared by Cardiology specialist.











Danish Ibanez MD Jan 27, 2018 09:12

## 2018-01-27 NOTE — PD
HPI


Chief Complaint:  Chest Pain


Time Seen by Provider:  01:23


Travel History


International Travel<30 days:  No


Contact w/Intl Traveler<30days:  No


Traveled to known affect area:  No





History of Present Illness


HPI


8-year-old female complains of retrosternal chest pain which started at rest 

about 3 hours prior to ER arrival.  There is radiation to the left posterior 

neck.  Shortness of breath is reported.  She underwent coronary catheterization 

approximately 3 months prior and mild to moderate LAD disease was observed with 

a patent stent and normal left ventricular function.  The patient reports 

noncompliance of medications including Plavix for the past week due to not 

having any available.  No fever or cough.  Severity moderate.





PFSH


Past Medical History


Hx Anticoagulant Therapy:  Yes


Arthritis:  Yes


Asthma:  No


Autoimmune Disease:  No


Blood Disorders:  No


Anxiety:  No


Depression:  No


Heart Rhythm Problems:  No


Cancer:  Yes (LEFT BREAST)


Cardiovascular Problems:  Yes (stent 16)


High Cholesterol:  No


Chemotherapy:  No


Chest Pain:  Yes


Congestive Heart Failure:  No


COPD:  Yes


Diabetes:  No


Diminished Hearing:  No


Endocrine:  No


Gastrointestinal Disorders:  Yes


Genitourinary:  Yes


Hypertension:  Yes


Immune Disorder:  No


Implanted Vascular Access Dvce:  Yes (cardiac stent)


Kidney Stones:  Yes


Musculoskeletal:  Yes


Neurologic:  No


Psychiatric:  No


Reproductive:  Yes (TOTAL HYSTERECTOMY)


Respiratory:  Yes


Immunizations Current:  No


Myocardial Infarction:  Yes ("SMALL MI YEARS AGO")


Radiation Therapy:  Yes


Sleep Apnea:  No


Thyroid Disease:  No


Menopausal:  Yes


:  13


Para:  12


Miscarriage:  1


Dilation and Curettage (D&C):  Yes





Past Surgical History


Abdominal Surgery:  No


AICD:  No


Arteriovenous Shunt:  No


Body Medical Devices:  cardiac stent, bliateral lens implants


Cardiac Surgery:  Yes (angioplasty 16)


Ear Surgery:  No


Endocrine Surgery:  No


Eye Surgery:  Yes (BL CATARACT REMOVAL, BL LENSE IMPLANT)


Genitourinary Surgery:  No


Gynecologic Surgery:  Yes (TOTAL HYSTERECTOMY)


Hysterectomy:  Yes


Insulin Pump:  No


Joint Replacement:  No


Mastectomy:  Yes (L MASTECTOMY)


Neurologic Surgery:  No


Oral Surgery:  No


Pacemaker:  No


Thoracic Surgery:  Yes


Other Surgery:  Yes (L PARTIAL MASTECTOMY)





Social History


Alcohol Use:  No


Tobacco Use:  No


Substance Use:  No





Allergies-Medications


(Allergen,Severity, Reaction):  


Coded Allergies:  


     lisinopril (Unverified  Allergy, Mild, COUGH, 8/15/17)


Reported Meds & Prescriptions





Reported Meds & Active Scripts


Active


Tylenol-Codeine #3 (Acetaminophen-Codeine) 300-30 mg Tab 1-2 Tab PO Q6H PRN


Reported


Ranitidine (Ranitidine HCl) 150 Mg Cap 150 Mg PO DAILY


Potassium Chloride CR (Potassium Chloride) 10 Meq Tab 20 Meq PO BID


Nitroglycerin SL (Nitroglycerin) 0.3 Mg Subl 0.3 Mg SL AS DIRECTED PRN


     ONE TABLET UNDER THE TONGUE AS NEEDED FOR CHEST PAIN, MAY REPEAT EVERY


     FIVE MINUTES FOR A TOTAL OF 3 DOSES OR CALL 911 IF NO RELIEF


Metoprolol Tartrate 25 Mg Tab 25 Mg PO BID


Losartan (Losartan Potassium) 100 Mg Tab 100 Mg PO DAILY


Isosorbide Mononitrate 10 Mg Tab 30 Mg PO DAILY


     Take 2 doses 7 hours apart.


[hydrodiuril]   25 Mg PO DAILY


Plavix (Clopidogrel Bisulfate) 75 Mg Tab 75 Mg PO DAILY


Lipitor (Atorvastatin Calcium) 80 Mg Tab 80 Mg PO HS


Aspirin 81 Mg Chew 81 Mg CHEW DAILY








Review of Systems


Except as stated in HPI:  all other systems reviewed are Neg


General / Constitutional:  No: Fever





Physical Exam


Narrative


GENERAL: 82-year-old female mild distress speaking full sentences


SKIN: Warm and dry.


HEAD: Atraumatic. Normocephalic. 


EYES: Pupils equal and round. No scleral icterus. No injection or drainage. 


ENT: No nasal bleeding or discharge.  Mucous membranes pink and moist.


NECK: Trachea midline. No JVD. 


CARDIOVASCULAR: Regular rate and rhythm.  


RESPIRATORY: No accessory muscle use. Clear to auscultation. Breath sounds 

equal bilaterally. 


GASTROINTESTINAL: Abdomen soft, non-tender, nondistended. Hepatic and splenic 

margins not palpable. 


MUSCULOSKELETAL: Extremities without clubbing, cyanosis, or edema. No obvious 

deformities. 


NEUROLOGICAL: Awake and alert. No obvious cranial nerve deficits.  Motor 

grossly within normal limits. Five out of 5 muscle strength in the arms and 

legs.  Normal speech.


PSYCHIATRIC: Appropriate mood and affect; insight and judgment normal.





Data


Data


Last Documented VS





Vital Signs








  Date Time  Temp Pulse Resp B/P (MAP) Pulse Ox O2 Delivery O2 Flow Rate FiO2


 


18 02:45  75 16 156/88 (110) 97   


 


18 01:20 97.6       


 


18 01:12      Room Air  





Vital signs reviewed


Orders





 Orders


Electrocardiogram (18 01:25)


Basic Metabolic Panel (Bmp) (18 01:25)


Ckmb (Isoenzyme) Profile (18 01:25)


Complete Blood Count With Diff (18 01:25)


Magnesium (Mg) (18:25)


Prothrombin Time / Inr (Pt) (18 01:25)


Act Partial Throm Time (Ptt) (18:25)


Troponin I (18:25)


Chest, Single Ap (18:25)


Ecg Monitoring (18:25)


Bilateral Bp Monitoring (18:25)


Iv Access Insert/Monitor (18:25)


Oximetry (18:25)


Oxygen Administration (18:25)


Aspirin (Aspirin) (18 01:30)


Morphine Inj (Morphine Inj) (18 01:30)


Sodium Chloride 0.9% Flush (Ns Flush) (18 01:30)


Nitroglycerin Sl (Nitrostat Sl) (18 02:00)


Metoprolol Tartrate Inj (Lopressor Inj) (18 02:00)


Clopidogrel (Plavix) (18 02:00)


CKMB (18 01:31)


CKMB% (18 01:31)


Admit Order (Ed Use Only) (18 02:42)





Labs





Laboratory Tests








Test


  18


01:31


 


White Blood Count 9.3 TH/MM3 


 


Red Blood Count 4.54 MIL/MM3 


 


Hemoglobin 13.9 GM/DL 


 


Hematocrit 40.6 % 


 


Mean Corpuscular Volume 89.4 FL 


 


Mean Corpuscular Hemoglobin 30.6 PG 


 


Mean Corpuscular Hemoglobin


Concent 34.3 % 


 


 


Red Cell Distribution Width 13.9 % 


 


Platelet Count 227 TH/MM3 


 


Mean Platelet Volume 7.9 FL 


 


Neutrophils (%) (Auto) 59.3 % 


 


Lymphocytes (%) (Auto) 26.0 % 


 


Monocytes (%) (Auto) 8.8 % 


 


Eosinophils (%) (Auto) 5.0 % 


 


Basophils (%) (Auto) 0.9 % 


 


Neutrophils # (Auto) 5.5 TH/MM3 


 


Lymphocytes # (Auto) 2.4 TH/MM3 


 


Monocytes # (Auto) 0.8 TH/MM3 


 


Eosinophils # (Auto) 0.5 TH/MM3 


 


Basophils # (Auto) 0.1 TH/MM3 


 


CBC Comment DIFF FINAL 


 


Differential Comment  


 


Prothrombin Time 10.3 SEC 


 


Prothromb Time International


Ratio 1.0 RATIO 


 


 


Activated Partial


Thromboplast Time 26.4 SEC 


 


 


Blood Urea Nitrogen 14 MG/DL 


 


Creatinine 0.89 MG/DL 


 


Random Glucose 104 MG/DL 


 


Calcium Level 9.1 MG/DL 


 


Magnesium Level 2.2 MG/DL 


 


Sodium Level 138 MEQ/L 


 


Potassium Level 4.2 MEQ/L 


 


Chloride Level 104 MEQ/L 


 


Carbon Dioxide Level 30.1 MEQ/L 


 


Anion Gap 4 MEQ/L 


 


Estimat Glomerular Filtration


Rate 61 ML/MIN 


 


 


Total Creatine Kinase 388 U/L 


 


Creatine Kinase MB 4.9 NG/ML 


 


Creatine Kinase MB % 1.3 % 


 


Troponin I 0.07 NG/ML 











MDM


Medical Decision Making


Medical Screen Exam Complete:  Yes


Emergency Medical Condition:  Yes


Medical Record Reviewed:  Yes


Differential Diagnosis


NSTEMI, unstable angina, coronary vasospasm, PE, PTX, aortic dissection, 

pericarditis, myocarditis, endocarditis, PNA, esophageal disease, aneurysm, 

musculoskeletal etiologies, anxiety, cocaine/sympathomimetic abuse


Narrative Course


EKG shows a sinus rhythm with a rate of 79 and AST changes seen in V2 V3 V4 as 

well as inferior leads have been seen in prior EKGs most recently October, 

potentially consistent with ischemia


CBC & BMP Diagram


18 01:31








Calcium Level 9.1, Magnesium Level 2.2





Troponin 0.07





Last 24 hours Impressions








Chest X-Ray 18 0125 Signed





Impressions: 





 Service Date/Time:  2018 01:32 - CONCLUSION: 

Cardiomegaly. 





     Richardson Wong MD 








Although the patient's troponin is elevated most times she comes here there is 

no compliance with Plavix for a week and EKG changes which are chronic in 

nature potentially concerning for ischemia.  





Nitroglycerin, morphine and metoprolol given. 


d/w Dr Liu for [facility]


Critical Care Narrative


Aggregate critical care time was 35 minutes. Time to perform other separately 

billable procedures was not  


included in the critical care time. My time did not include minutes spent 

treating any other patients simultaneously or on  


activities that did not directly contribute to the patient's treatment.  





The services I provided to this patient were to treat and/or prevent clinically 

significant deterioration that could result  


in: Death due to coronary artery disease, cardiopulmonary arrest





I provided critical care services requiring my management, as noted below:


Chart data review, documentation time, medication orders and management, vital 

sign assessments/reviewing monitor data,  


ordering and reviewing lab tests, ordering and interpreting/reviewing x-rays 

and diagnostic studies, care of the patient  


and discussion of the patient with the admitting physicians.





Diagnosis





 Primary Impression:  


 CAD (coronary artery disease)


 Qualified Codes:  I25.10 - Atherosclerotic heart disease of native coronary 

artery without angina pectoris


 Additional Impressions:  


 Elevated troponin


 Noncompliance with medications





Admitting Information


Admitting Physician Requests:  Admit











Bartolo Mccauley MD 2018 02:16

## 2018-01-27 NOTE — HHI.HP
__________________________________________________





HPI


Service


[facility]


Primary Care Physician


Asha Kaur MD


Admission Diagnosis





ELEVATED TROPONIN, CHEST PAIN, HTN, MEDICATION NON-COMPLIANCE


Diagnoses:  


(1) Chest pain


Diagnosis:  Principal





(2) Elevated troponin


Diagnosis:  Principal





(3) HTN (hypertension)


Diagnosis:  Principal





Travel History


International Travel<30 Days:  No


Contact w/Intl Traveler <30 Da:  No


Traveled to Known Affected Are:  No


History of Present Illness


This is an 82-year-old female with a PMH of HTN, Breast CA, Hyperlipidemia and 

CAD who presented ear with complaints of chest pain starting earlier tonight.  

States pain started acutely, is substernal in location, constant, severe, 8/10, 

occasional radiation to left neck.  S/p Cardiac Cath 17 by Dr. Kaur w/ 

mild to moderate CAD, patent stent mid LAD w/ recommendation for medical 

therapy.  States she's been off Plavix and "three other medications" for approx 

2wks secondary to insurance issues, daughter states she was "dropped" from 

Medicaid.  S/p NTG, Morphine in ER w/ some improvement, currently chest pain 

free.  On arrival, /160, HR 85, O2 sat 97% on RA, Afebrile.  S/p 

Metoprolol in ER.  CBC unremarkable.  Chemistry unremarkable.  .  

Troponin 0.07.  EKG with no acute ischemia.  INR 1.0.  CXR with cardiomegaly.





Review of Systems


Except as stated in HPI:  all other systems reviewed are Neg


ROS: 14 point review of systems otherwise negative.





Past Family Social History


Past Medical History


PMH:  HTN, Breast CA, Hyperlipidemia and CAD


Past Surgical History


PAST SURGICAL HISTORY:  Cardiac Stent, CABG, Bilateral Cataract Surgery, Total 

Hysterectomy, Left Mastectomy


Allergies:  


Coded Allergies:  


     lisinopril (Unverified  Allergy, Mild, COUGH, 8/15/17)


Family History


PAST FAMILY HISTORY:  Reviewed.  No h/o DM or CAD


Social History


PAST SOCIAL HISTORY:  Negative for alcohol, tobacco or drugs.





Physical Exam


Vital Signs





Vital Signs








  Date Time  Temp Pulse Resp B/P (MAP) Pulse Ox O2 Delivery O2 Flow Rate FiO2


 


18 02:45  75 16 156/88 (110) 97   


 


18 02:30  75 18 148/83 (104) 97   


 


18 02:00  77 16 165/80 (108) 98   


 


18 01:21  78      


 


18 01:20 97.6 80 18 229/102 (144) 98   


 


18 01:12 97.5 85 18 214/160 (178) 97 Room Air  








Physical Exam


PE:


GENERAL: Pleasant elderly white female in no acute distress.  Daughter bedside


HEENT: PERRLA, EOMI. No scleral icterus or conjunctival pallor. No lid lag or 

facial droop.  


CARDIOVASCULAR: Regular rate and rhythm.  No obvious murmurs to auscultation. 

No chest tenderness to palpation. 


RESPIRATORY: No obvious rhonchi or wheezing. Clear to auscultation. Breath 

sounds equal bilaterally. 


GASTROINTESTINAL: Abdomen soft, non-tender, nondistended. BS normal. 


MUSCULOSKELETAL: Extremities without clubbing, cyanosis, or edema. No obvious 

deformities. 


NEUROLOGICAL: Awake, alert and oriented x4. No focal neurologic deficits. 

Moving both upper and lower extremities spontaneously.


Laboratory





Laboratory Tests








Test


  18


01:31


 


White Blood Count 9.3 


 


Red Blood Count 4.54 


 


Hemoglobin 13.9 


 


Hematocrit 40.6 


 


Mean Corpuscular Volume 89.4 


 


Mean Corpuscular Hemoglobin 30.6 


 


Mean Corpuscular Hemoglobin


Concent 34.3 


 


 


Red Cell Distribution Width 13.9 


 


Platelet Count 227 


 


Mean Platelet Volume 7.9 


 


Neutrophils (%) (Auto) 59.3 


 


Lymphocytes (%) (Auto) 26.0 


 


Monocytes (%) (Auto) 8.8 


 


Eosinophils (%) (Auto) 5.0 


 


Basophils (%) (Auto) 0.9 


 


Neutrophils # (Auto) 5.5 


 


Lymphocytes # (Auto) 2.4 


 


Monocytes # (Auto) 0.8 


 


Eosinophils # (Auto) 0.5 


 


Basophils # (Auto) 0.1 


 


CBC Comment DIFF FINAL 


 


Differential Comment  


 


Prothrombin Time 10.3 


 


Prothromb Time International


Ratio 1.0 


 


 


Activated Partial


Thromboplast Time 26.4 


 


 


Blood Urea Nitrogen 14 


 


Creatinine 0.89 


 


Random Glucose 104 


 


Calcium Level 9.1 


 


Magnesium Level 2.2 


 


Sodium Level 138 


 


Potassium Level 4.2 


 


Chloride Level 104 


 


Carbon Dioxide Level 30.1 


 


Anion Gap 4 


 


Estimat Glomerular Filtration


Rate 61 


 


 


Total Creatine Kinase 388 


 


Creatine Kinase MB 4.9 


 


Creatine Kinase MB % 1.3 


 


Troponin I 0.07 








Result Diagram:  


18








Caprini VTE Risk Assessment


Caprini VTE Risk Assessment:  No/Low Risk (score <= 1)


Caprini Risk Assessment Model











 Point Value = 1          Point Value = 2  Point Value = 3  Point Value = 5


 


Age 41-60


Minor surgery


BMI > 25 kg/m2


Swollen legs


Varicose veins


Pregnancy or postpartum


History of unexplained or recurrent


   spontaneous 


Oral contraceptives or hormone


   replacement


Sepsis (< 1 month)


Serious lung disease, including


   pneumonia (< 1 month)


Abnormal pulmonary function


Acute myocardial infarction


Congestive heart failure (< 1 month)


History of inflammatory bowel disease


Medical patient at bed rest Age 61-74


Arthroscopic surgery


Major open surgery (> 45 min)


Laparoscopic surgery (> 45 min)


Malignancy


Confined to bed (> 72 hours)


Immobilizing plaster cast


Central venous access Age >= 75


History of VTE


Family history of VTE


Factor V Leiden


Prothrombin 80937M


Lupus anticoagulant


Anticardiolipin antibodies


Elevated serum homocysteine


Heparin-induced thrombocytopenia


Other congenital or acquired


   thrombophilia Stroke (< 1 month)


Elective arthroplasty


Hip, pelvis, or leg fracture


Acute spinal cord injury (< 1 month)








Prophylaxis Regimen











   Total Risk


Factor Score Risk Level Prophylaxis Regimen


 


0-1      Low Early ambulation


 


2 Moderate Order ONE of the following:


*Sequential Compression Device (SCD)


*Heparin 5000 units SQ BID


 


3-4 Higher Order ONE of the following medications:


*Heparin 5000 units SQ TID


*Enoxaparin/Lovenox 40 mg SQ daily (WT < 150 kg, CrCl > 30 mL/min)


*Enoxaparin/Lovenox 30 mg SQ daily (WT < 150 kg, CrCl > 10-29 mL/min)


*Enoxaparin/Lovenox 30 mg SQ BID (WT < 150 kg, CrCl > 30 mL/min)


AND/OR


*Sequential Compression Device (SCD)


 


5 or more Highest Order ONE of the following medications:


*Heparin 5000 units SQ TID (Preferred with Epidurals)


*Enoxaparin/Lovenox 40 mg SQ daily (WT < 150 kg, CrCl > 30 mL/min)


*Enoxaparin/Lovenox 30 mg SQ daily (WT < 150 kg, CrCl > 10-29 mL/min)


*Enoxaparin/Lovenox 30 mg SQ BID (WT < 150 kg, CrCl > 30 mL/min)


AND


*Sequential Compression Device (SCD)











Assessment and Plan


Problem List:  


(1) Chest pain


ICD Code:  R07.9 - Chest pain


Status:  Acute


(2) Elevated troponin


ICD Code:  R74.8 - Elevated troponin level


Status:  Acute


(3) HTN (hypertension)


ICD Code:  I10 - Essential (primary) hypertension


Status:  Acute


Assessment and Plan


A/P: 


1.  Chest Pain:  acute onset of chest pain, substernal, occasional radiation, s/

p NTG/Morphine in ER w/ improvement, currently chest pain free.  Will admit for 

Observation, place on Telemetry.  Continue NTG/Morphine as needed.  CXR w/ 

cardiomegaly, images reviewed by me.


2.  Elevated Trop:  Trop 0.07, h/o CAD, r/o ACS w/ ischemia.  Pt non-compliant w

/ Plavix for approx 2 wks, will resume home ASA, Plavix.  Check serial cardiac 

enzymes for trend, B-Blocker, Statin.  Follows w/ Dr. Kaur as outpatient, 

will consult for further eval/recommendations. 


3.  HTN:  Uncontrolled.  Secondary to non-compliance w/ medication.  S/p 

Metoprolol in ER w/ improvement.  Resume home medications, monitor BP closely. 


4.  DVT Prophylaxis:  SCD/Teds. 


5.  Social work for d/c planning as needed. 


6.  Case discussed at length w/ ER physician, labs/imaging/records reviewed by 

me.











Mary Liu MD 2018 03:14

## 2018-01-28 VITALS
DIASTOLIC BLOOD PRESSURE: 79 MMHG | OXYGEN SATURATION: 91 % | HEART RATE: 77 BPM | TEMPERATURE: 97.9 F | RESPIRATION RATE: 18 BRPM | SYSTOLIC BLOOD PRESSURE: 164 MMHG

## 2018-01-28 VITALS
TEMPERATURE: 97.7 F | DIASTOLIC BLOOD PRESSURE: 91 MMHG | SYSTOLIC BLOOD PRESSURE: 187 MMHG | RESPIRATION RATE: 20 BRPM | OXYGEN SATURATION: 95 % | HEART RATE: 73 BPM

## 2018-01-28 VITALS — HEART RATE: 73 BPM

## 2018-01-28 VITALS
SYSTOLIC BLOOD PRESSURE: 169 MMHG | TEMPERATURE: 97.6 F | HEART RATE: 67 BPM | RESPIRATION RATE: 18 BRPM | OXYGEN SATURATION: 98 % | DIASTOLIC BLOOD PRESSURE: 85 MMHG

## 2018-01-28 VITALS
HEART RATE: 67 BPM | TEMPERATURE: 97.4 F | SYSTOLIC BLOOD PRESSURE: 172 MMHG | OXYGEN SATURATION: 98 % | RESPIRATION RATE: 18 BRPM | DIASTOLIC BLOOD PRESSURE: 84 MMHG

## 2018-01-28 VITALS — DIASTOLIC BLOOD PRESSURE: 90 MMHG | SYSTOLIC BLOOD PRESSURE: 162 MMHG

## 2018-01-28 VITALS
OXYGEN SATURATION: 96 % | DIASTOLIC BLOOD PRESSURE: 89 MMHG | HEART RATE: 74 BPM | TEMPERATURE: 97.9 F | SYSTOLIC BLOOD PRESSURE: 181 MMHG | RESPIRATION RATE: 20 BRPM

## 2018-01-28 VITALS — HEART RATE: 71 BPM

## 2018-01-28 VITALS
DIASTOLIC BLOOD PRESSURE: 89 MMHG | SYSTOLIC BLOOD PRESSURE: 179 MMHG | TEMPERATURE: 97.4 F | RESPIRATION RATE: 20 BRPM | OXYGEN SATURATION: 98 % | HEART RATE: 71 BPM

## 2018-01-28 VITALS — DIASTOLIC BLOOD PRESSURE: 90 MMHG | SYSTOLIC BLOOD PRESSURE: 180 MMHG

## 2018-01-28 VITALS — SYSTOLIC BLOOD PRESSURE: 168 MMHG | DIASTOLIC BLOOD PRESSURE: 90 MMHG

## 2018-01-28 LAB
ALBUMIN SERPL-MCNC: 3.6 GM/DL (ref 3.4–5)
ALP SERPL-CCNC: 63 U/L (ref 45–117)
ALT SERPL-CCNC: 20 U/L (ref 10–53)
AST SERPL-CCNC: 17 U/L (ref 15–37)
BASOPHILS # BLD AUTO: 0.1 TH/MM3 (ref 0–0.2)
BASOPHILS NFR BLD: 0.8 % (ref 0–2)
BILIRUB SERPL-MCNC: 0.8 MG/DL (ref 0.2–1)
BUN SERPL-MCNC: 11 MG/DL (ref 7–18)
CALCIUM SERPL-MCNC: 8.3 MG/DL (ref 8.5–10.1)
CHLORIDE SERPL-SCNC: 104 MEQ/L (ref 98–107)
CREAT SERPL-MCNC: 0.93 MG/DL (ref 0.5–1)
EOSINOPHIL # BLD: 0.3 TH/MM3 (ref 0–0.4)
EOSINOPHIL NFR BLD: 4.4 % (ref 0–4)
ERYTHROCYTE [DISTWIDTH] IN BLOOD BY AUTOMATED COUNT: 13.9 % (ref 11.6–17.2)
GFR SERPLBLD BASED ON 1.73 SQ M-ARVRAT: 58 ML/MIN (ref 89–?)
GLUCOSE SERPL-MCNC: 126 MG/DL (ref 74–106)
HCO3 BLD-SCNC: 28.1 MEQ/L (ref 21–32)
HCT VFR BLD CALC: 38.8 % (ref 35–46)
HGB BLD-MCNC: 12.9 GM/DL (ref 11.6–15.3)
LYMPHOCYTES # BLD AUTO: 1.4 TH/MM3 (ref 1–4.8)
LYMPHOCYTES NFR BLD AUTO: 18.3 % (ref 9–44)
MCH RBC QN AUTO: 30.3 PG (ref 27–34)
MCHC RBC AUTO-ENTMCNC: 33.2 % (ref 32–36)
MCV RBC AUTO: 91.2 FL (ref 80–100)
MONOCYTE #: 0.5 TH/MM3 (ref 0–0.9)
MONOCYTES NFR BLD: 6.2 % (ref 0–8)
NEUTROPHILS # BLD AUTO: 5.3 TH/MM3 (ref 1.8–7.7)
NEUTROPHILS NFR BLD AUTO: 70.3 % (ref 16–70)
PLATELET # BLD: 204 TH/MM3 (ref 150–450)
PMV BLD AUTO: 8 FL (ref 7–11)
PROT SERPL-MCNC: 7.1 GM/DL (ref 6.4–8.2)
RBC # BLD AUTO: 4.25 MIL/MM3 (ref 4–5.3)
SODIUM SERPL-SCNC: 140 MEQ/L (ref 136–145)
WBC # BLD AUTO: 7.6 TH/MM3 (ref 4–11)

## 2018-01-28 RX ADMIN — ATORVASTATIN CALCIUM SCH MG: 80 TABLET, FILM COATED ORAL at 21:02

## 2018-01-28 RX ADMIN — FAMOTIDINE SCH MG: 20 TABLET, FILM COATED ORAL at 08:34

## 2018-01-28 RX ADMIN — PHENYTOIN SODIUM SCH MLS/HR: 50 INJECTION INTRAMUSCULAR; INTRAVENOUS at 08:34

## 2018-01-28 RX ADMIN — LOSARTAN POTASSIUM SCH MG: 50 TABLET, FILM COATED ORAL at 08:35

## 2018-01-28 RX ADMIN — ASPIRIN 81 MG SCH MG: 81 TABLET ORAL at 08:35

## 2018-01-28 RX ADMIN — ISOSORBIDE MONONITRATE SCH MG: 30 TABLET, EXTENDED RELEASE ORAL at 08:34

## 2018-01-28 RX ADMIN — CLOPIDOGREL BISULFATE SCH MG: 75 TABLET, FILM COATED ORAL at 08:35

## 2018-01-28 RX ADMIN — Medication SCH ML: at 08:35

## 2018-01-28 RX ADMIN — STANDARDIZED SENNA CONCENTRATE AND DOCUSATE SODIUM SCH TAB: 8.6; 5 TABLET, FILM COATED ORAL at 08:34

## 2018-01-28 RX ADMIN — METOPROLOL TARTRATE SCH MG: 25 TABLET, FILM COATED ORAL at 21:01

## 2018-01-28 RX ADMIN — METOPROLOL TARTRATE SCH MG: 25 TABLET, FILM COATED ORAL at 08:34

## 2018-01-28 RX ADMIN — FAMOTIDINE SCH MG: 20 TABLET, FILM COATED ORAL at 21:01

## 2018-01-28 RX ADMIN — Medication SCH ML: at 21:02

## 2018-01-28 RX ADMIN — STANDARDIZED SENNA CONCENTRATE AND DOCUSATE SODIUM SCH TAB: 8.6; 5 TABLET, FILM COATED ORAL at 21:01

## 2018-01-28 NOTE — HHI.PR
Subjective


Remarks


This is a pleasant 81 y/o Female with hypertension, Breast Cancer, 

hyperlipidemia, CAD who came to ER with Chest pain


S/p Cardiac Cath 5/16/17 by Dr. Kaur w/ mild to moderate CAD, patent stent 

mid LAD w/ recommendation for medical therapy.  


has status post CABG.





1/28: Seen by Cardiology specialist with diagnosis of Chest pain, probable 

secondary to Hypertensive Emergency on admission


stable in her bedroom, chest pain free. discussed about non compliance issues. 








Objective





Vital Signs








  Date Time  Temp Pulse Resp B/P (MAP) Pulse Ox O2 Delivery O2 Flow Rate FiO2


 


1/28/18 08:45    162/90 (114)    


 


1/28/18 04:00 97.4 67 18 172/84 (113) 98   


 


1/28/18 04:00  67      


 


1/28/18 00:00  69      


 


1/28/18 00:00 97.6 67 18 169/85 (113) 98   


 


1/27/18 20:00  75      


 


1/27/18 20:00 97.6 70 18 168/78 (108) 97   


 


1/27/18 19:00     98 Nasal Cannula 2.00 


 


1/27/18 16:00 97.9 77 20 160/80 (106) 96   


 


1/27/18 12:00 97.5 74 20 156/74 (101) 96   














I/O      


 


 1/27/18 1/27/18 1/27/18 1/28/18 1/28/18 1/28/18





 07:00 15:00 23:00 07:00 15:00 23:00


 


Intake Total  874 ml 240 ml  1000 ml 


 


Balance  874 ml 240 ml  1000 ml 


 


      


 


Intake Oral   240 ml   


 


IV Total  874 ml   1000 ml 


 


# Voids 1 1 7 1  


 


# Bowel Movements   0 1  








Result Diagram:  


1/27/18 0131                                                                   

             1/27/18 0131





Imaging





Last Impressions








Chest X-Ray 1/27/18 0125 Signed





Impressions: 





 Service Date/Time:  Saturday, January 27, 2018 01:32 - CONCLUSION: 

Cardiomegaly. 





     Richardson Wong MD 








Procedures


None


Other Results





Laboratory Tests








Test


  1/27/18


01:31 1/27/18


18:47


 


White Blood Count 9.3 TH/MM3  


 


Red Blood Count 4.54 MIL/MM3  


 


Hemoglobin 13.9 GM/DL  


 


Hematocrit 40.6 %  


 


Mean Corpuscular Volume 89.4 FL  


 


Mean Corpuscular Hemoglobin 30.6 PG  


 


Mean Corpuscular Hemoglobin


Concent 34.3 % 


  


 


 


Red Cell Distribution Width 13.9 %  


 


Platelet Count 227 TH/MM3  


 


Mean Platelet Volume 7.9 FL  


 


Neutrophils (%) (Auto) 59.3 %  


 


Lymphocytes (%) (Auto) 26.0 %  


 


Monocytes (%) (Auto) 8.8 %  


 


Eosinophils (%) (Auto) 5.0 %  


 


Basophils (%) (Auto) 0.9 %  


 


Neutrophils # (Auto) 5.5 TH/MM3  


 


Lymphocytes # (Auto) 2.4 TH/MM3  


 


Monocytes # (Auto) 0.8 TH/MM3  


 


Eosinophils # (Auto) 0.5 TH/MM3  


 


Basophils # (Auto) 0.1 TH/MM3  


 


CBC Comment DIFF FINAL  


 


Differential Comment   


 


Prothrombin Time 10.3 SEC  


 


Prothromb Time International


Ratio 1.0 RATIO 


  


 


 


Activated Partial


Thromboplast Time 26.4 SEC 


  


 


 


Blood Urea Nitrogen 14 MG/DL  


 


Creatinine 0.89 MG/DL  


 


Random Glucose 104 MG/DL  


 


Calcium Level 9.1 MG/DL  


 


Magnesium Level 2.2 MG/DL  


 


Sodium Level 138 MEQ/L  


 


Potassium Level 4.2 MEQ/L  


 


Chloride Level 104 MEQ/L  


 


Carbon Dioxide Level 30.1 MEQ/L  


 


Anion Gap 4 MEQ/L  


 


Estimat Glomerular Filtration


Rate 61 ML/MIN 


  


 


 


Total Creatine Kinase 388 U/L  


 


Creatine Kinase MB 4.9 NG/ML  


 


Creatine Kinase MB % 1.3 %  


 


Troponin I  0.06 NG/ML 








Objective Remarks


GENERAL: No acute distress. 


HEENT: PERRLA, EOMI. No scleral icterus or conjunctival pallor. No lid lag or 

facial droop.  


CARDIOVASCULAR: Regular rate and rhythm.  No obvious murmurs to auscultation. 

No chest tenderness to palpation. 


RESPIRATORY: No obvious rhonchi or wheezing. Clear to auscultation. Breath 

sounds equal bilaterally. 


GASTROINTESTINAL: Abdomen soft, non-tender, nondistended. BS normal. 


MUSCULOSKELETAL: Extremities without clubbing, cyanosis, or edema. No obvious 

deformities. 


NEUROLOGICAL: Awake, alert and oriented x4. No focal neurologic deficits. 

Moving both upper and lower extremities spontaneously.





Medications and IVs





Current Medications








 Medications


  (Trade)  Dose


 Ordered  Sig/Kaitlin


 Route  Start Time


 Stop Time Status Last Admin


 


 Sodium Chloride  1,000 ml @ 


 100 mls/hr  Q10H


 IV  1/27/18 02:49


    1/28/18 08:34


 


 


  (NS Flush)  2 ml  UNSCH  PRN


 IV FLUSH  1/27/18 03:00


     


 


 


  (NS Flush)  2 ml  BID


 IV FLUSH  1/27/18 09:00


    1/27/18 08:37


 


 


  (Zofran Inj)  4 mg  Q6H  PRN


 IVP  1/27/18 03:00


     


 


 


  (Tylenol)  650 mg  Q6H  PRN


 PO  1/27/18 03:00


     


 


 


  (Norco  5-325 Mg)  1 tab  Q4H  PRN


 PO  1/27/18 03:00


     


 


 


  (Morphine Inj)  2 mg  Q3H  PRN


 IV PUSH  1/27/18 03:00


     


 


 


  (Juanita-Colace)  1 tab  BID


 PO  1/27/18 09:00


    1/28/18 08:34


 


 


  (Milk Of


 Magnesia Liq)  30 ml  Q12H  PRN


 PO  1/27/18 03:00


     


 


 


  (Senokot)  17.2 mg  Q12H  PRN


 PO  1/27/18 03:00


     


 


 


  (Dulcolax Supp)  10 mg  DAILY  PRN


 RECTAL  1/27/18 03:00


     


 


 


  (Lactulose Liq)  30 ml  DAILY  PRN


 PO  1/27/18 03:00


     


 


 


  (Aspirin Chew)  81 mg  DAILY


 CHEW  1/27/18 09:00


    1/28/18 08:35


 


 


  (Lipitor)  80 mg  HS


 PO  1/27/18 21:00


    1/27/18 22:14


 


 


  (Plavix)  75 mg  DAILY


 PO  1/27/18 09:00


    1/28/18 08:35


 


 


  (Imdur)  30 mg  DAILY


 PO  1/27/18 09:00


    1/28/18 08:34


 


 


  (Cozaar)  100 mg  DAILY


 PO  1/27/18 09:00


    1/28/18 08:35


 


 


  (Lopressor)  25 mg  BID


 PO  1/27/18 09:00


    1/28/18 08:34


 


 


  (Nitroglycerin


 2% Oint)  0.5 inch  Q6HR  PRN


 TOPICAL  1/27/18 03:00


     


 


 


  (Pepcid)  10 mg  BID


 PO  1/27/18 21:00


    1/28/18 08:34


 











A/P


Assessment and Plan


(1) Chest pain


ICD Code:  R07.9 - Chest pain


Status:  Acute


(2) Elevated troponin


ICD Code:  R74.8 - Elevated troponin level


Status:  Acute


(3) HTN (hypertension)


ICD Code:  I10 - Essential (primary) hypertension


Status:  Acute





1.  Chest Pain:  Cardiology following, Troponin 0.06


2.  Elevated Trop:  Trop 0.06 now trending down., h/o CAD, r/o ACS w/ ischemia.

, will resume home ASA, Plavix.  Check serial cardiac enzymes for trend, B-

Blocker, Statin. 


3.  HTN:  Uncontrolled.  increased Isosorbide mononitrate to 60 mg given one 

dose now. also will use Clonidine for systolic blood pressure over 160 mm Hg. 


4.  Obesity strongly recommended diet and exercise as outpatient. 


5.  Medical Non compliance. 











DVT Prophylaxis:  Lovenox. 


Social work for d/c planning as needed. 


discussed with patient and Nurse in the room Miss Godinez


Discharge Planning


Once cleared by Cardiology specialist.











Danish Ibanez MD Jan 28, 2018 09:25

## 2018-01-28 NOTE — PD.CARD.PN
Subjective


Subjective Remarks


No CP or SOB, feels much better





Objective


Medications





Current Medications








 Medications


  (Trade)  Dose


 Ordered  Sig/Kaitlin


 Route  Start Time


 Stop Time Status Last Admin


 


  (NS Flush)  2 ml  UNSCH  PRN


 IV FLUSH  1/27/18 03:00


     


 


 


  (NS Flush)  2 ml  BID


 IV FLUSH  1/27/18 09:00


    1/27/18 08:37


 


 


  (Zofran Inj)  4 mg  Q6H  PRN


 IVP  1/27/18 03:00


     


 


 


  (Tylenol)  650 mg  Q6H  PRN


 PO  1/27/18 03:00


     


 


 


  (Norco  5-325 Mg)  1 tab  Q4H  PRN


 PO  1/27/18 03:00


     


 


 


  (Morphine Inj)  2 mg  Q3H  PRN


 IV PUSH  1/27/18 03:00


     


 


 


  (Juanita-Colace)  1 tab  BID


 PO  1/27/18 09:00


    1/28/18 08:34


 


 


  (Milk Of


 Magnesia Liq)  30 ml  Q12H  PRN


 PO  1/27/18 03:00


     


 


 


  (Senokot)  17.2 mg  Q12H  PRN


 PO  1/27/18 03:00


     


 


 


  (Dulcolax Supp)  10 mg  DAILY  PRN


 RECTAL  1/27/18 03:00


     


 


 


  (Lactulose Liq)  30 ml  DAILY  PRN


 PO  1/27/18 03:00


     


 


 


  (Aspirin Chew)  81 mg  DAILY


 CHEW  1/27/18 09:00


    1/28/18 08:35


 


 


  (Lipitor)  80 mg  HS


 PO  1/27/18 21:00


    1/27/18 22:14


 


 


  (Plavix)  75 mg  DAILY


 PO  1/27/18 09:00


    1/28/18 08:35


 


 


  (Cozaar)  100 mg  DAILY


 PO  1/27/18 09:00


    1/28/18 08:35


 


 


  (Lopressor)  25 mg  BID


 PO  1/27/18 09:00


    1/28/18 08:34


 


 


  (Nitroglycerin


 2% Oint)  0.5 inch  Q6HR  PRN


 TOPICAL  1/27/18 03:00


     


 


 


  (Pepcid)  10 mg  BID


 PO  1/27/18 21:00


    1/28/18 08:34


 


 


  (Imdur)  60 mg  DAILY


 PO  1/29/18 09:00


     


 


 


  (Catapres)  0.1 mg  Q8HR  PRN


 PO  1/28/18 16:15


     


 


 


  (Lovenox Inj)  40 mg  Q24H


 SQ  1/28/18 18:00


     


 








Vital Signs / I&O





Vital Signs








  Date Time  Temp Pulse Resp B/P (MAP) Pulse Ox O2 Delivery O2 Flow Rate FiO2


 


1/28/18 12:00 97.7 75 20 187/91 (123) 95   


 


1/28/18 08:45    162/90 (114)    


 


1/28/18 08:00 97.4 75 20 179/89 (119) 98   


 


1/28/18 04:00 97.4 67 18 172/84 (113) 98   


 


1/28/18 04:00  67      


 


1/28/18 00:00  69      


 


1/28/18 00:00 97.6 67 18 169/85 (113) 98   


 


1/27/18 20:00  75      


 


1/27/18 20:00 97.6 70 18 168/78 (108) 97   


 


1/27/18 19:00     98 Nasal Cannula 2.00 














I/O      


 


 1/27/18 1/27/18 1/27/18 1/28/18 1/28/18 1/28/18





 07:00 15:00 23:00 07:00 15:00 23:00


 


Intake Total  874 ml 240 ml  1000 ml 


 


Balance  874 ml 240 ml  1000 ml 


 


      


 


Intake Oral   240 ml   


 


IV Total  874 ml   1000 ml 


 


# Voids 1 1 7 1  


 


# Bowel Movements   0 1  








Physical Exam


GENERAL: In NAD


SKIN: Warm and dry.


HEAD: Normocephalic.


EYES: No scleral icterus. No injection or drainage. 


NECK: Supple, trachea midline. No JVD or lymphadenopathy.


CARDIOVASCULAR: Regular rate and rhythm without murmurs, gallops, or rubs. 


RESPIRATORY: Breath sounds equal bilaterally. No accessory muscle use.


GASTROINTESTINAL: Abdomen soft, non-tender, nondistended. 


MUSCULOSKELETAL: No cyanosis, or edema.





Laboratory





Laboratory Tests








Test


  1/27/18


18:47 1/28/18


10:19


 


Troponin I 0.06 NG/ML  


 


White Blood Count  7.6 TH/MM3 


 


Red Blood Count  4.25 MIL/MM3 


 


Hemoglobin  12.9 GM/DL 


 


Hematocrit  38.8 % 


 


Mean Corpuscular Volume  91.2 FL 


 


Mean Corpuscular Hemoglobin  30.3 PG 


 


Mean Corpuscular Hemoglobin


Concent 


  33.2 % 


 


 


Red Cell Distribution Width  13.9 % 


 


Platelet Count  204 TH/MM3 


 


Mean Platelet Volume  8.0 FL 


 


Neutrophils (%) (Auto)  70.3 % 


 


Lymphocytes (%) (Auto)  18.3 % 


 


Monocytes (%) (Auto)  6.2 % 


 


Eosinophils (%) (Auto)  4.4 % 


 


Basophils (%) (Auto)  0.8 % 


 


Neutrophils # (Auto)  5.3 TH/MM3 


 


Lymphocytes # (Auto)  1.4 TH/MM3 


 


Monocytes # (Auto)  0.5 TH/MM3 


 


Eosinophils # (Auto)  0.3 TH/MM3 


 


Basophils # (Auto)  0.1 TH/MM3 


 


CBC Comment  DIFF FINAL 


 


Differential Comment   


 


Blood Urea Nitrogen  11 MG/DL 


 


Creatinine  0.93 MG/DL 


 


Random Glucose  126 MG/DL 


 


Total Protein  7.1 GM/DL 


 


Albumin  3.6 GM/DL 


 


Calcium Level  8.3 MG/DL 


 


Alkaline Phosphatase  63 U/L 


 


Aspartate Amino Transf


(AST/SGOT) 


  17 U/L 


 


 


Alanine Aminotransferase


(ALT/SGPT) 


  20 U/L 


 


 


Total Bilirubin  0.8 MG/DL 


 


Sodium Level  140 MEQ/L 


 


Potassium Level  3.9 MEQ/L 


 


Chloride Level  104 MEQ/L 


 


Carbon Dioxide Level  28.1 MEQ/L 


 


Anion Gap  8 MEQ/L 


 


Estimat Glomerular Filtration


Rate 


  58 ML/MIN 


 











Assessment and Plan


Problem List:  


(1) Uncontrolled HTN


Status:  Acute


(2) CAD (coronary artery disease)


ICD Codes:  I25.10 - Atherosclerotic heart disease of native coronary artery 

without angina pectoris


Status:  Acute


(3) Presence of stent in LAD coronary artery


ICD Codes:  Z95.5 - Presence of coronary angioplasty implant and graft


Status:  Acute


(4) Elevated troponin


ICD Codes:  R74.8 - Elevated troponin level


Status:  Acute


(5) Noncompliance with medications


ICD Codes:  Z91.14 - Patient's other noncompliance with medication regimen


Status:  Acute


(6) HTN (hypertension)


ICD Codes:  I10 - Essential (primary) hypertension


Status:  Acute


(7) Hyperlipemia


ICD Codes:  E78.5 - Hyperlipidemia, unspecified


Status:  Acute


(8) GERD (gastroesophageal reflux disease)


ICD Codes:  K21.9 - Gastro-esophageal reflux disease without esophagitis


Status:  Acute


Assessment and Plan


Troponin slightly elevated, but no trending. Doubt ACS. Symptoms resolved. 

Still hypertensive, titrate antihypertensive tx. I stressed the importance of 

compliance with medical therapy. Anticipate discharge home. Will schedule outpt 

f/u after discharge.





Problem Qualifiers





(1) CAD (coronary artery disease):  


Qualified Codes:  I25.10 - Atherosclerotic heart disease of native coronary 

artery without angina pectoris








Asha Kaur MD Jan 28, 2018 17:14

## 2018-01-29 VITALS
HEART RATE: 74 BPM | SYSTOLIC BLOOD PRESSURE: 143 MMHG | TEMPERATURE: 97.3 F | RESPIRATION RATE: 18 BRPM | DIASTOLIC BLOOD PRESSURE: 70 MMHG | OXYGEN SATURATION: 95 %

## 2018-01-29 VITALS
RESPIRATION RATE: 18 BRPM | SYSTOLIC BLOOD PRESSURE: 168 MMHG | TEMPERATURE: 97.3 F | OXYGEN SATURATION: 95 % | DIASTOLIC BLOOD PRESSURE: 91 MMHG | HEART RATE: 75 BPM

## 2018-01-29 VITALS
HEART RATE: 73 BPM | DIASTOLIC BLOOD PRESSURE: 57 MMHG | RESPIRATION RATE: 18 BRPM | OXYGEN SATURATION: 98 % | SYSTOLIC BLOOD PRESSURE: 120 MMHG | TEMPERATURE: 97.7 F

## 2018-01-29 VITALS
SYSTOLIC BLOOD PRESSURE: 143 MMHG | HEART RATE: 72 BPM | OXYGEN SATURATION: 91 % | TEMPERATURE: 97.9 F | DIASTOLIC BLOOD PRESSURE: 74 MMHG | RESPIRATION RATE: 18 BRPM

## 2018-01-29 VITALS — HEART RATE: 72 BPM

## 2018-01-29 RX ADMIN — METOPROLOL TARTRATE SCH MG: 25 TABLET, FILM COATED ORAL at 09:19

## 2018-01-29 RX ADMIN — LOSARTAN POTASSIUM SCH MG: 50 TABLET, FILM COATED ORAL at 09:19

## 2018-01-29 RX ADMIN — Medication SCH ML: at 09:00

## 2018-01-29 RX ADMIN — CLOPIDOGREL BISULFATE SCH MG: 75 TABLET, FILM COATED ORAL at 09:20

## 2018-01-29 RX ADMIN — STANDARDIZED SENNA CONCENTRATE AND DOCUSATE SODIUM SCH TAB: 8.6; 5 TABLET, FILM COATED ORAL at 09:20

## 2018-01-29 RX ADMIN — ASPIRIN 81 MG SCH MG: 81 TABLET ORAL at 09:19

## 2018-01-29 RX ADMIN — FAMOTIDINE SCH MG: 20 TABLET, FILM COATED ORAL at 09:20

## 2018-01-29 NOTE — HHI.DS
__________________________________________________





Discharge Summary


Admission Date


Jan 27, 2018 at 02:45


Discharge Date:  Jan 29, 2018


Admitting Diagnosis





ELEVATED TROPONIN, CHEST PAIN, HTN, MEDICATION NON-COMPLIANCE





(1) Chest pain


ICD Code:  R07.9 - Chest pain


Diagnosis:  Principal


Status:  Acute


(2) Elevated troponin


ICD Code:  R74.8 - Elevated troponin level


Diagnosis:  Principal


Status:  Acute


(3) Hypertensive emergency


ICD Code:  I16.1 - Hypertensive emergency


Diagnosis:  Principal





Procedures


None


Brief History - From Admission


This is an 82-year-old female with a PMH of HTN, Breast CA, Hyperlipidemia and 

CAD who presented ear with complaints of chest pain starting earlier tonight.  

States pain started acutely, is substernal in location, constant, severe, 8/10, 

occasional radiation to left neck.  S/p Cardiac Cath 5/16/17 by Dr. Kaur w/ 

mild to moderate CAD, patent stent mid LAD w/ recommendation for medical 

therapy.  States she's been off Plavix and "three other medications" for approx 

2wks secondary to insurance issues, daughter states she was "dropped" from 

Medicaid.  S/p NTG, Morphine in ER w/ some improvement, currently chest pain 

free.  On arrival, /160, HR 85, O2 sat 97% on RA, Afebrile.  S/p 

Metoprolol in ER.  CBC unremarkable.  Chemistry unremarkable.  .  

Troponin 0.07.  EKG with no acute ischemia.  INR 1.0.  CXR with cardiomegaly.


CBC/BMP:  


1/28/18 1019                                                                   

             1/28/18 1019





Significant Findings





Laboratory Tests








Test


  1/27/18


01:31 1/27/18


10:35 1/27/18


18:47 1/28/18


10:19


 


Monocytes (%) (Auto)


  8.8 %


(0.0-8.0) 


  


  


 


 


Eosinophils (%) (Auto)


  5.0 %


(0.0-4.0) 


  


  4.4 %


(0.0-4.0)


 


Eosinophils # (Auto)


  0.5 TH/MM3


(0-0.4) 


  


  


 


 


Anion Gap 4 MEQ/L (5-15)    


 


Estimat Glomerular Filtration


Rate 61 ML/MIN


(>89) 


  


  58 ML/MIN


(>89)


 


Total Creatine Kinase


  388 U/L


() 


  


  


 


 


Creatine Kinase MB


  4.9 NG/ML


(0.5-3.6) 


  


  


 


 


Troponin I


  0.07 NG/ML


(0.02-0.05) 0.06 NG/ML


(0.02-0.05) 0.06 NG/ML


(0.02-0.05) 


 


 


Neutrophils (%) (Auto)


  


  


  


  70.3 %


(16.0-70.0)


 


Random Glucose


  


  


  


  126 MG/DL


()


 


Calcium Level


  


  


  


  8.3 MG/DL


(8.5-10.1)








Imaging





Last Impressions








Chest X-Ray 1/27/18 0125 Signed





Impressions: 





 Service Date/Time:  Saturday, January 27, 2018 01:32 - CONCLUSION: 

Cardiomegaly. 





     Richardson Wong MD 








PE at Discharge


GENERAL: No acute distress. 


HEENT: PERRLA, EOMI. No scleral icterus or conjunctival pallor. No lid lag or 

facial droop.  


CARDIOVASCULAR: Regular rate and rhythm.  No obvious murmurs to auscultation. 

No chest tenderness to palpation. 


RESPIRATORY: No obvious rhonchi or wheezing. Clear to auscultation. Breath 

sounds equal bilaterally. 


GASTROINTESTINAL: Abdomen soft, non-tender, nondistended. BS normal. 


MUSCULOSKELETAL: Extremities without clubbing, cyanosis, or edema. No obvious 

deformities. 


NEUROLOGICAL: Awake, alert and oriented x4. No focal neurologic deficits. 

Moving both upper and lower extremities spontaneously.


Hospital Course


This is a pleasant 83 y/o Female with hypertension, Breast Cancer, 

hyperlipidemia, CAD who came to ER with Chest pain


S/p Cardiac Cath 5/16/17 by Dr. Kaur w/ mild to moderate CAD, patent stent 

mid LAD w/ recommendation for medical therapy.  


has status post CABG.





1/28: Seen by Cardiology specialist with diagnosis of Chest pain, probable 

secondary to Hypertensive Emergency on admission


stable in her bedroom, chest pain free. discussed about non compliance issues. 





1/29: Stable, controlled blood pressure now, discussed for a long time with her 

and her daughter due to medical non compliance


to make her understand the importance of her lifestyle modification. 





Assessment and Plan


(1) Chest pain


ICD Code:  R07.9 - Chest pain


Status:  Acute


(2) Elevated troponin


ICD Code:  R74.8 - Elevated troponin level


Status:  Acute


(3) HTN (hypertension)


ICD Code:  I10 - Essential (primary) hypertension


Status:  Acute





1.  Chest Pain:  Cardiology following, Troponin 0.06 cleared by Cardiology for 

discharge no ACS


2.  Elevated Trop:  Trop 0.06 now trending down., h/o CAD, r/o ACS w/ ischemia.

, will resume home ASA, Plavix.  Check serial cardiac enzymes for trend, B-

Blocker, Statin. 


     will need to be compliance with medicines, thought by cardiology related 

to Hypertensive Emergency. 


3.  Hypertensive Emergency Improved, controlled today, increased Imdur to 60 mg 

daily and continue Amlodipine 10 mg daily, not on diuretics blood pressure at 

this time


     138/68 mm Hg. 


4.  Obesity strongly recommended diet and exercise as outpatient. 


5.  Medical Non compliance. 











DVT Prophylaxis:  Lovenox. 


Social work for d/c planning as needed. 


discussed with patient, her Daughter and nurse Miss Gonzalez.


Discharge Planning


Discharge Home today.


Pt Condition on Discharge:  Good


Discharge Disposition:  Discharge Home


Discharge Time:  > 30 minutes


Discharge Instructions


DIET: Follow Instructions for:  Heart Healthy Diet


Activities you can perform:  Regular-No Restrictions











Danish Ibanez MD Jan 29, 2018 11:17

## 2018-01-29 NOTE — HHI.PR
Subjective


Remarks


This is a pleasant 83 y/o Female with hypertension, Breast Cancer, 

hyperlipidemia, CAD who came to ER with Chest pain


S/p Cardiac Cath 5/16/17 by Dr. Kaur w/ mild to moderate CAD, patent stent 

mid LAD w/ recommendation for medical therapy.  


has status post CABG.





1/28: Seen by Cardiology specialist with diagnosis of Chest pain, probable 

secondary to Hypertensive Emergency on admission


stable in her bedroom, chest pain free. discussed about non compliance issues. 





1/29: Stable, controlled blood pressure now, discussed for a long time with her 

and her daughter due to medical non compliance


to make her understand the importance of her lifestyle modification. 








Objective





Vital Signs








  Date Time  Temp Pulse Resp B/P (MAP) Pulse Ox O2 Delivery O2 Flow Rate FiO2


 


1/29/18 09:24     95 Room Air  


 


1/29/18 08:10 97.3 75 18 168/91 (116) 95   


 


1/29/18 04:00 97.9 72 18 143/74 (97) 91   


 


1/29/18 03:46  72      


 


1/29/18 00:00 97.7 73 18 120/57 (78) 98   


 


1/28/18 23:45  71      


 


1/28/18 21:00 97.9 77 18 164/79 (107) 91   


 


1/28/18 20:00      Room Air  


 


1/28/18 19:46  73      


 


1/28/18 19:30    180/90 (120)    


 


1/28/18 16:30    168/90 (116)    


 


1/28/18 16:03 97.9 74 20 181/89 (119) 96   


 


1/28/18 16:00  73      


 


1/28/18 12:00  73      


 


1/28/18 12:00 97.7 75 20 187/91 (123) 95   














I/O      


 


 1/28/18 1/28/18 1/28/18 1/29/18 1/29/18 1/29/18





 07:00 15:00 23:00 07:00 15:00 23:00


 


Intake Total  1000 ml 1800 ml   


 


Output Total   2500 ml   


 


Balance  1000 ml -700 ml   


 


      


 


Intake Oral   1800 ml   


 


IV Total  1000 ml    


 


Output Urine Total   2500 ml   


 


# Voids 1    4 


 


# Bowel Movements 1  0   








Result Diagram:  


1/28/18 1019                                                                   

             1/28/18 1019





Imaging





Last Impressions








Chest X-Ray 1/27/18 0125 Signed





Impressions: 





 Service Date/Time:  Saturday, January 27, 2018 01:32 - CONCLUSION: 

Cardiomegaly. 





     Richardson Wong MD 








Procedures


None


Other Results





Laboratory Tests








Test


  1/27/18


01:31 1/27/18


18:47 1/28/18


10:19


 


Prothrombin Time 10.3 SEC   


 


Prothromb Time International


Ratio 1.0 RATIO 


  


  


 


 


Activated Partial


Thromboplast Time 26.4 SEC 


  


  


 


 


Blood Urea Nitrogen 14 MG/DL   11 MG/DL 


 


Creatinine 0.89 MG/DL   0.93 MG/DL 


 


Random Glucose 104 MG/DL   126 MG/DL 


 


Calcium Level 9.1 MG/DL   8.3 MG/DL 


 


Magnesium Level 2.2 MG/DL   


 


Sodium Level 138 MEQ/L   140 MEQ/L 


 


Potassium Level 4.2 MEQ/L   3.9 MEQ/L 


 


Chloride Level 104 MEQ/L   104 MEQ/L 


 


Carbon Dioxide Level 30.1 MEQ/L   28.1 MEQ/L 


 


Total Creatine Kinase 388 U/L   


 


Creatine Kinase MB 4.9 NG/ML   


 


Creatine Kinase MB % 1.3 %   


 


Troponin I  0.06 NG/ML  


 


White Blood Count   7.6 TH/MM3 


 


Red Blood Count   4.25 MIL/MM3 


 


Hemoglobin   12.9 GM/DL 


 


Hematocrit   38.8 % 


 


Mean Corpuscular Volume   91.2 FL 


 


Mean Corpuscular Hemoglobin   30.3 PG 


 


Mean Corpuscular Hemoglobin


Concent 


  


  33.2 % 


 


 


Red Cell Distribution Width   13.9 % 


 


Platelet Count   204 TH/MM3 


 


Mean Platelet Volume   8.0 FL 


 


Neutrophils (%) (Auto)   70.3 % 


 


Lymphocytes (%) (Auto)   18.3 % 


 


Monocytes (%) (Auto)   6.2 % 


 


Eosinophils (%) (Auto)   4.4 % 


 


Basophils (%) (Auto)   0.8 % 


 


Neutrophils # (Auto)   5.3 TH/MM3 


 


Lymphocytes # (Auto)   1.4 TH/MM3 


 


Monocytes # (Auto)   0.5 TH/MM3 


 


Eosinophils # (Auto)   0.3 TH/MM3 


 


Basophils # (Auto)   0.1 TH/MM3 


 


CBC Comment   DIFF FINAL 


 


Differential Comment    


 


Total Protein   7.1 GM/DL 


 


Albumin   3.6 GM/DL 


 


Alkaline Phosphatase   63 U/L 


 


Aspartate Amino Transf


(AST/SGOT) 


  


  17 U/L 


 


 


Alanine Aminotransferase


(ALT/SGPT) 


  


  20 U/L 


 


 


Total Bilirubin   0.8 MG/DL 


 


Anion Gap   8 MEQ/L 


 


Estimat Glomerular Filtration


Rate 


  


  58 ML/MIN 


 








Objective Remarks


GENERAL: No acute distress. 


HEENT: PERRLA, EOMI. No scleral icterus or conjunctival pallor. No lid lag or 

facial droop.  


CARDIOVASCULAR: Regular rate and rhythm.  No obvious murmurs to auscultation. 

No chest tenderness to palpation. 


RESPIRATORY: No obvious rhonchi or wheezing. Clear to auscultation. Breath 

sounds equal bilaterally. 


GASTROINTESTINAL: Abdomen soft, non-tender, nondistended. BS normal. 


MUSCULOSKELETAL: Extremities without clubbing, cyanosis, or edema. No obvious 

deformities. 


NEUROLOGICAL: Awake, alert and oriented x4. No focal neurologic deficits. 

Moving both upper and lower extremities spontaneously.





Medications and IVs





Current Medications








 Medications


  (Trade)  Dose


 Ordered  Sig/Kaitlin


 Route  Start Time


 Stop Time Status Last Admin


 


  (NS Flush)  2 ml  UNSCH  PRN


 IV FLUSH  1/27/18 03:00


     


 


 


  (NS Flush)  2 ml  BID


 IV FLUSH  1/27/18 09:00


    1/28/18 21:02


 


 


  (Zofran Inj)  4 mg  Q6H  PRN


 IVP  1/27/18 03:00


     


 


 


  (Tylenol)  650 mg  Q6H  PRN


 PO  1/27/18 03:00


     


 


 


  (Norco  5-325 Mg)  1 tab  Q4H  PRN


 PO  1/27/18 03:00


     


 


 


  (Morphine Inj)  2 mg  Q3H  PRN


 IV PUSH  1/27/18 03:00


     


 


 


  (Juanita-Colace)  1 tab  BID


 PO  1/27/18 09:00


    1/29/18 09:20


 


 


  (Milk Of


 Magnesia Liq)  30 ml  Q12H  PRN


 PO  1/27/18 03:00


     


 


 


  (Senokot)  17.2 mg  Q12H  PRN


 PO  1/27/18 03:00


     


 


 


  (Dulcolax Supp)  10 mg  DAILY  PRN


 RECTAL  1/27/18 03:00


     


 


 


  (Lactulose Liq)  30 ml  DAILY  PRN


 PO  1/27/18 03:00


     


 


 


  (Aspirin Chew)  81 mg  DAILY


 CHEW  1/27/18 09:00


    1/29/18 09:19


 


 


  (Lipitor)  80 mg  HS


 PO  1/27/18 21:00


    1/28/18 21:02


 


 


  (Plavix)  75 mg  DAILY


 PO  1/27/18 09:00


    1/29/18 09:20


 


 


  (Cozaar)  100 mg  DAILY


 PO  1/27/18 09:00


    1/29/18 09:19


 


 


  (Lopressor)  25 mg  BID


 PO  1/27/18 09:00


    1/29/18 09:19


 


 


  (Nitroglycerin


 2% Oint)  0.5 inch  Q6HR  PRN


 TOPICAL  1/27/18 03:00


     


 


 


  (Pepcid)  10 mg  BID


 PO  1/27/18 21:00


    1/29/18 09:20


 


 


  (Imdur)  60 mg  DAILY


 PO  1/29/18 09:00


    1/29/18 09:19


 


 


  (Catapres)  0.1 mg  Q8HR  PRN


 PO  1/28/18 16:15


    1/28/18 19:45


 


 


  (Lovenox Inj)  40 mg  Q24H


 SQ  1/28/18 18:00


    1/28/18 19:23


 


 


  (Norvasc)  10 mg  DAILY


 PO  1/29/18 09:00


    1/29/18 09:20


 











A/P


Assessment and Plan


(1) Chest pain


ICD Code:  R07.9 - Chest pain


Status:  Acute


(2) Elevated troponin


ICD Code:  R74.8 - Elevated troponin level


Status:  Acute


(3) HTN (hypertension)


ICD Code:  I10 - Essential (primary) hypertension


Status:  Acute





1.  Chest Pain:  Cardiology following, Troponin 0.06 cleared by Cardiology for 

discharge no ACS


2.  Elevated Trop:  Trop 0.06 now trending down., h/o CAD, r/o ACS w/ ischemia.

, will resume home ASA, Plavix.  Check serial cardiac enzymes for trend, B-

Blocker, Statin. 


     will need to be compliance with medicines, thought by cardiology related 

to Hypertensive Emergency. 


3.  Hypertensive Emergency Improved, controlled today, increased Imdur to 60 mg 

daily and continue Amlodipine 10 mg daily, not on diuretics 


4.  Obesity strongly recommended diet and exercise as outpatient. 


5.  Medical Non compliance. 











DVT Prophylaxis:  Lovenox. 


Social work for d/c planning as needed. 


discussed with patient, her Daughter and nurse Miss Gonzalez.


Discharge Planning


Discharge Home today.











Danish Ibanez MD Jan 29, 2018 09:27

## 2018-02-10 ENCOUNTER — HOSPITAL ENCOUNTER (INPATIENT)
Dept: HOSPITAL 17 - NEPC | Age: 83
LOS: 3 days | Discharge: HOME HEALTH | DRG: 190 | End: 2018-02-13
Attending: FAMILY MEDICINE | Admitting: FAMILY MEDICINE
Payer: MEDICARE

## 2018-02-10 VITALS
DIASTOLIC BLOOD PRESSURE: 92 MMHG | HEART RATE: 110 BPM | OXYGEN SATURATION: 93 % | TEMPERATURE: 98.8 F | SYSTOLIC BLOOD PRESSURE: 189 MMHG | RESPIRATION RATE: 18 BRPM

## 2018-02-10 VITALS
DIASTOLIC BLOOD PRESSURE: 67 MMHG | HEART RATE: 108 BPM | SYSTOLIC BLOOD PRESSURE: 158 MMHG | OXYGEN SATURATION: 95 % | RESPIRATION RATE: 26 BRPM

## 2018-02-10 VITALS
RESPIRATION RATE: 19 BRPM | DIASTOLIC BLOOD PRESSURE: 67 MMHG | HEART RATE: 96 BPM | SYSTOLIC BLOOD PRESSURE: 148 MMHG | TEMPERATURE: 98 F | OXYGEN SATURATION: 96 %

## 2018-02-10 VITALS
RESPIRATION RATE: 18 BRPM | SYSTOLIC BLOOD PRESSURE: 163 MMHG | HEART RATE: 92 BPM | DIASTOLIC BLOOD PRESSURE: 72 MMHG | OXYGEN SATURATION: 95 %

## 2018-02-10 VITALS — HEART RATE: 96 BPM | RESPIRATION RATE: 18 BRPM | OXYGEN SATURATION: 100 %

## 2018-02-10 VITALS
OXYGEN SATURATION: 95 % | RESPIRATION RATE: 33 BRPM | SYSTOLIC BLOOD PRESSURE: 162 MMHG | DIASTOLIC BLOOD PRESSURE: 70 MMHG | HEART RATE: 116 BPM

## 2018-02-10 VITALS — OXYGEN SATURATION: 97 %

## 2018-02-10 VITALS — BODY MASS INDEX: 34.89 KG/M2 | HEIGHT: 62 IN | WEIGHT: 189.6 LBS

## 2018-02-10 VITALS — OXYGEN SATURATION: 95 %

## 2018-02-10 DIAGNOSIS — Z95.5: ICD-10-CM

## 2018-02-10 DIAGNOSIS — E78.5: ICD-10-CM
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
## 2018-02-10 DIAGNOSIS — I25.10: ICD-10-CM

## 2018-02-10 DIAGNOSIS — J18.9: ICD-10-CM

## 2018-02-10 DIAGNOSIS — R00.0: ICD-10-CM

## 2018-02-10 DIAGNOSIS — E87.6: ICD-10-CM

## 2018-02-10 DIAGNOSIS — Z85.3: ICD-10-CM

## 2018-02-10 DIAGNOSIS — J44.1: ICD-10-CM

## 2018-02-10 DIAGNOSIS — I25.2: ICD-10-CM

## 2018-02-10 DIAGNOSIS — Z96.652: ICD-10-CM

## 2018-02-10 DIAGNOSIS — J44.0: Primary | ICD-10-CM

## 2018-02-10 DIAGNOSIS — I10: ICD-10-CM

## 2018-02-10 DIAGNOSIS — F17.200: ICD-10-CM

## 2018-02-10 LAB
ALBUMIN SERPL-MCNC: 3.7 GM/DL (ref 3.4–5)
ALP SERPL-CCNC: 60 U/L (ref 45–117)
ALT SERPL-CCNC: 23 U/L (ref 10–53)
AST SERPL-CCNC: 42 U/L (ref 15–37)
BASOPHILS # BLD AUTO: 0.1 TH/MM3 (ref 0–0.2)
BASOPHILS NFR BLD: 0.5 % (ref 0–2)
BILIRUB SERPL-MCNC: 1.1 MG/DL (ref 0.2–1)
BUN SERPL-MCNC: 13 MG/DL (ref 7–18)
CALCIUM SERPL-MCNC: 8.2 MG/DL (ref 8.5–10.1)
CHLORIDE SERPL-SCNC: 99 MEQ/L (ref 98–107)
CREAT SERPL-MCNC: 0.9 MG/DL (ref 0.5–1)
EOSINOPHIL # BLD: 0.1 TH/MM3 (ref 0–0.4)
EOSINOPHIL NFR BLD: 1 % (ref 0–4)
ERYTHROCYTE [DISTWIDTH] IN BLOOD BY AUTOMATED COUNT: 14 % (ref 11.6–17.2)
GFR SERPLBLD BASED ON 1.73 SQ M-ARVRAT: 60 ML/MIN (ref 89–?)
GLUCOSE SERPL-MCNC: 115 MG/DL (ref 74–106)
HCO3 BLD-SCNC: 27.4 MEQ/L (ref 21–32)
HCT VFR BLD CALC: 38.5 % (ref 35–46)
HGB BLD-MCNC: 13 GM/DL (ref 11.6–15.3)
INR PPP: 1.1 RATIO
LYMPHOCYTES # BLD AUTO: 1 TH/MM3 (ref 1–4.8)
LYMPHOCYTES NFR BLD AUTO: 10.5 % (ref 9–44)
MCH RBC QN AUTO: 30.2 PG (ref 27–34)
MCHC RBC AUTO-ENTMCNC: 33.9 % (ref 32–36)
MCV RBC AUTO: 89 FL (ref 80–100)
MONOCYTE #: 0.8 TH/MM3 (ref 0–0.9)
MONOCYTES NFR BLD: 8.8 % (ref 0–8)
NEUTROPHILS # BLD AUTO: 7.6 TH/MM3 (ref 1.8–7.7)
NEUTROPHILS NFR BLD AUTO: 79.2 % (ref 16–70)
PLATELET # BLD: 203 TH/MM3 (ref 150–450)
PMV BLD AUTO: 8.7 FL (ref 7–11)
PROT SERPL-MCNC: 8 GM/DL (ref 6.4–8.2)
PROTHROMBIN TIME: 11.1 SEC (ref 9.8–11.6)
RBC # BLD AUTO: 4.32 MIL/MM3 (ref 4–5.3)
SODIUM SERPL-SCNC: 134 MEQ/L (ref 136–145)
TROPONIN I SERPL-MCNC: 0.09 NG/ML (ref 0.02–0.05)
WBC # BLD AUTO: 9.7 TH/MM3 (ref 4–11)

## 2018-02-10 PROCEDURE — 96372 THER/PROPH/DIAG INJ SC/IM: CPT

## 2018-02-10 PROCEDURE — 71045 X-RAY EXAM CHEST 1 VIEW: CPT

## 2018-02-10 PROCEDURE — 85027 COMPLETE CBC AUTOMATED: CPT

## 2018-02-10 PROCEDURE — 87070 CULTURE OTHR SPECIMN AEROBIC: CPT

## 2018-02-10 PROCEDURE — 80053 COMPREHEN METABOLIC PANEL: CPT

## 2018-02-10 PROCEDURE — 84484 ASSAY OF TROPONIN QUANT: CPT

## 2018-02-10 PROCEDURE — 83880 ASSAY OF NATRIURETIC PEPTIDE: CPT

## 2018-02-10 PROCEDURE — 87040 BLOOD CULTURE FOR BACTERIA: CPT

## 2018-02-10 PROCEDURE — 96376 TX/PRO/DX INJ SAME DRUG ADON: CPT

## 2018-02-10 PROCEDURE — 83605 ASSAY OF LACTIC ACID: CPT

## 2018-02-10 PROCEDURE — 80048 BASIC METABOLIC PNL TOTAL CA: CPT

## 2018-02-10 PROCEDURE — 96365 THER/PROPH/DIAG IV INF INIT: CPT

## 2018-02-10 PROCEDURE — 94640 AIRWAY INHALATION TREATMENT: CPT

## 2018-02-10 PROCEDURE — 96375 TX/PRO/DX INJ NEW DRUG ADDON: CPT

## 2018-02-10 PROCEDURE — 85730 THROMBOPLASTIN TIME PARTIAL: CPT

## 2018-02-10 PROCEDURE — 87804 INFLUENZA ASSAY W/OPTIC: CPT

## 2018-02-10 PROCEDURE — 94618 PULMONARY STRESS TESTING: CPT

## 2018-02-10 PROCEDURE — 87205 SMEAR GRAM STAIN: CPT

## 2018-02-10 PROCEDURE — 96368 THER/DIAG CONCURRENT INF: CPT

## 2018-02-10 PROCEDURE — 93005 ELECTROCARDIOGRAM TRACING: CPT

## 2018-02-10 PROCEDURE — 85610 PROTHROMBIN TIME: CPT

## 2018-02-10 PROCEDURE — 96361 HYDRATE IV INFUSION ADD-ON: CPT

## 2018-02-10 PROCEDURE — 94664 DEMO&/EVAL PT USE INHALER: CPT

## 2018-02-10 PROCEDURE — G0378 HOSPITAL OBSERVATION PER HR: HCPCS

## 2018-02-10 PROCEDURE — 94150 VITAL CAPACITY TEST: CPT

## 2018-02-10 PROCEDURE — 85025 COMPLETE CBC W/AUTO DIFF WBC: CPT

## 2018-02-10 RX ADMIN — Medication SCH ML: at 21:24

## 2018-02-10 RX ADMIN — GUAIFENESIN PRN MG: 600 TABLET, EXTENDED RELEASE ORAL at 21:32

## 2018-02-10 RX ADMIN — TAZOBACTAM SODIUM AND PIPERACILLIN SODIUM SCH MLS/HR: 500; 4 INJECTION, SOLUTION INTRAVENOUS at 21:00

## 2018-02-10 RX ADMIN — METHYLPREDNISOLONE SODIUM SUCCINATE SCH MG: 40 INJECTION, POWDER, FOR SOLUTION INTRAMUSCULAR; INTRAVENOUS at 21:22

## 2018-02-10 RX ADMIN — LOSARTAN POTASSIUM SCH MG: 50 TABLET, FILM COATED ORAL at 21:33

## 2018-02-10 RX ADMIN — BENZONATATE PRN MG: 100 CAPSULE ORAL at 21:32

## 2018-02-10 RX ADMIN — IPRATROPIUM BROMIDE AND ALBUTEROL SULFATE SCH AMPULE: .5; 3 SOLUTION RESPIRATORY (INHALATION) at 19:53

## 2018-02-10 RX ADMIN — HEPARIN SODIUM SCH UNITS: 10000 INJECTION, SOLUTION INTRAVENOUS; SUBCUTANEOUS at 21:22

## 2018-02-10 RX ADMIN — CLOPIDOGREL BISULFATE SCH MG: 75 TABLET, FILM COATED ORAL at 21:33

## 2018-02-10 RX ADMIN — ASPIRIN 81 MG SCH MG: 81 TABLET ORAL at 21:32

## 2018-02-10 RX ADMIN — ATORVASTATIN CALCIUM SCH MG: 80 TABLET, FILM COATED ORAL at 21:24

## 2018-02-10 RX ADMIN — ISOSORBIDE MONONITRATE SCH MG: 30 TABLET, EXTENDED RELEASE ORAL at 21:31

## 2018-02-10 RX ADMIN — IPRATROPIUM BROMIDE AND ALBUTEROL SULFATE SCH AMPULE: .5; 3 SOLUTION RESPIRATORY (INHALATION) at 23:10

## 2018-02-10 NOTE — RADRPT
EXAM DATE/TIME:  02/10/2018 16:29 

 

[facility] COMPARISON:     

CHEST SINGLE AP, May 13, 2017, 23:12.  CHEST SINGLE AP, October 14, 2017, 20:57.  CHEST SINGLE AP, Ja
nuary 27, 2018, 1:32.

 

                     

INDICATIONS :     

Shortness of breath and cough. 

                     

 

MEDICAL HISTORY :     

Hypertension.  Chronic obstructive pulmonary disease.  Myocardial infarction.   Breast cancer.    

 

SURGICAL HISTORY :     

Mastectomy, left.   

 

ENCOUNTER:     

Initial                                        

 

ACUITY:     

1 week      

 

PAIN SCORE:     

0/10

 

LOCATION:     

Bilateral chest 

 

FINDINGS:     

Frontal views chest demonstrates interval development of partially consolidative ill-defined infiltra
te in the lower lateral left lung post loss of delineation of a portion of the lateral left hemidiaph
ragm.  There is also fullness in the infrahilar region on the right side suggesting subsegmental infi
ltrate.  The heart is normal in size.  The right hemidiaphragm is well delineated.  The upper lungs a
re clear.  Orthopedic anchors in the proximal left humerus.

 

CONCLUSION:     

Interval development of subsegmental infiltrates lower lateral left lung and right infrahilar region.


 

 

 

 Anurag Ryan MD on February 10, 2018 at 16:49           

Board Certified Radiologist.

 This report was verified electronically.

## 2018-02-10 NOTE — HHI.HP
HPI


Service


Family Medicine


Primary Care Physician


No Primary Care Physician


Admission Diagnosis





Pneumonia


Diagnoses:  


International Travel<30 Days:  No


Contact w/Intl Traveler<30days:  No


Known Affected Area:  No


History of Present Illness


82 yr old F w/ hypertension, hyperlipidemia, CAD s/p stent in LAD who presents 

with "feeling sick." Accompanied by daughter and granddaughter. Patient 

recently hospitalized on  for chest pain due to hypertensive emergency and 

medication noncompliance, discharged on . Daughter states that "she has 

been sick all week with a bug." Her 8 yr old grandson that lives with her has 

been experiencing flu-like symptoms, but has been improving with antibiotics. 

Daughter went to check on patient today and found her on a chair unable to move 

due to fatigue. She reports that her mom lives in a trailer, which was 

extremely hot upon arrival due to air conditioner being off. States that 

patient was also coughing up white-clearish mucus and had 1 episode of vomiting

, productive of "greenish-bile fluid".  Daughter became concerned and brought 

mom to ED. Patient is normally independent at baseline. Able to walk around on 

her own, rarely uses her walker, and able to dress and feed herself. Last meal 

was last night. No changes in appetite per patient.  Patient endorses sore 

throat and congestion. Denies fevers, runny nose, diarrhea, CP, SOB, abdominal 

pain, and N/V. 


 (Elaine Schaffer MD R1)





Review of Systems


Constitutional:  DENIES: Fever, Weight loss, Night Sweats


Eyes:  DENIES: Vision loss


Ears, nose, mouth, throat:  COMPLAINS OF: Throat pain, DENIES: Running Nose


Respiratory:  COMPLAINS OF: Cough, Shortness of breath


Cardiovascular:  DENIES: Chest pain


Gastrointestinal:  COMPLAINS OF: Vomiting, DENIES: Abdominal pain, Diarrhea, 

Nausea


Genitourinary:  DENIES: Dysuria


Musculoskeletal:  DENIES: Muscle aches


Integumentary:  DENIES: Rash


Neurologic:  DENIES: Headache (Elaine Schaffer MD R1)





Past Family Social History


Past Medical History


HTN 


Hx of Breast CA


Hyperlipidemia  


CAD s/p LAD stent 17


Past Surgical History


Cardiac cath 17


Bilateral Cataract Surgery


Total Hysterectomy


Left Mastectomy


Left knee replacement


 (Elaine Schaffer MD R1)


Allergies:  


Coded Allergies:  


     lisinopril (Unverified  Allergy, Mild, COUGH, 2/10/18)


Family History


No h/o DM or CAD


Social History


Lives alone with 8yr old grandson


Never smoker, admits to Snuff use 


Denies alcohol and illicit drug use 


 (Elaine Schaffer MD R1)





Physical Exam


Vital Signs





Vital Signs








  Date Time  Temp Pulse Resp B/P (MAP) Pulse Ox O2 Delivery O2 Flow Rate FiO2


 


2/10/18 18:25  108 26 158/67 (97) 95 Nasal Cannula 2.00 


 


2/10/18 17:57     97 Nasal Cannula 2.00 


 


2/10/18 15:37  116 33 162/70 (100) 95 Nasal Cannula 2.00 


 


2/10/18 15:14 98.8 110 18 189/92 (124) 93   








Physical Exam


GENERAL:  elderly female, lying in bed, currently on 2L NC, in NAD


SKIN: No rashes, ecchymoses or lesions. Cool and dry.


HEAD: Atraumatic. Normocephalic. No temporal or scalp tenderness.


EYES: Pupils equal round and reactive. Extraocular motions intact. No scleral 

icterus. No injection or drainage. 


ENT: Nose without bleeding, purulent drainage or septal hematoma. Throat 

without erythema, tonsillar hypertrophy or exudate. Uvula midline. Airway 

patent.


NECK: Trachea midline. No JVD or lymphadenopathy. Supple, nontender, no 

meningeal signs.


CARDIOVASCULAR: Irregular rate and rhythm, no m/r/g


RESPIRATORY: Coarse breath sounds throughout


GASTROINTESTINAL: Abdomen soft, non-tender, nondistended. No hepato-splenomegaly

, or palpable masses. No guarding.


MUSCULOSKELETAL: Extremities without clubbing, cyanosis, or edema. No joint 

tenderness, effusion, or edema noted. No calf tenderness. 


NEUROLOGICAL: Awake, alert, and oriented x3. No neuro deficits.


Laboratory





Laboratory Tests








Test


  2/10/18


16:10 2/10/18


16:38


 


White Blood Count 9.7  


 


Red Blood Count 4.32  


 


Hemoglobin 13.0  


 


Hematocrit 38.5  


 


Mean Corpuscular Volume 89.0  


 


Mean Corpuscular Hemoglobin 30.2  


 


Mean Corpuscular Hemoglobin


Concent 33.9 


  


 


 


Red Cell Distribution Width 14.0  


 


Platelet Count 203  


 


Mean Platelet Volume 8.7  


 


Neutrophils (%) (Auto) 79.2  


 


Lymphocytes (%) (Auto) 10.5  


 


Monocytes (%) (Auto) 8.8  


 


Eosinophils (%) (Auto) 1.0  


 


Basophils (%) (Auto) 0.5  


 


Neutrophils # (Auto) 7.6  


 


Lymphocytes # (Auto) 1.0  


 


Monocytes # (Auto) 0.8  


 


Eosinophils # (Auto) 0.1  


 


Basophils # (Auto) 0.1  


 


CBC Comment DIFF FINAL  


 


Differential Comment   


 


Prothrombin Time 11.1  


 


Prothromb Time International


Ratio 1.1 


  


 


 


Activated Partial


Thromboplast Time 28.8 


  


 


 


Blood Urea Nitrogen 13  


 


Creatinine 0.90  


 


Random Glucose 115  


 


Total Protein 8.0  


 


Albumin 3.7  


 


Calcium Level 8.2  


 


Alkaline Phosphatase 60  


 


Aspartate Amino Transf


(AST/SGOT) 42 


  


 


 


Alanine Aminotransferase


(ALT/SGPT) 23 


  


 


 


Total Bilirubin 1.1  


 


Sodium Level 134  


 


Potassium Level 3.4  


 


Chloride Level 99  


 


Carbon Dioxide Level 27.4  


 


Anion Gap 8  


 


Estimat Glomerular Filtration


Rate 60 


  


 


 


Troponin I 0.09  


 


B-Type Natriuretic Peptide 149  


 


Lactic Acid Level  0.9 














 Date/Time


Source Procedure


Growth Status


 


 


 2/10/18 16:36


Nasal Washing Influenza Types A,B Antigen (DACIA) - Final


NEGATIVE FOR FLU A AND B ANTIGEN.... Complete








 (Elaine Schaffer MD R1)


Result Diagram:  


2/10/18 1610                                                                   

             2/10/18 1610








Caprini VTE Risk Assessment


Caprini VTE Risk Assessment:  Mod/High Risk (score >= 2)


Caprini Risk Assessment Model











 Point Value = 1          Point Value = 2  Point Value = 3  Point Value = 5


 


Age 41-60


Minor surgery


BMI > 25 kg/m2


Swollen legs


Varicose veins


Pregnancy or postpartum


History of unexplained or recurrent


   spontaneous 


Oral contraceptives or hormone


   replacement


Sepsis (< 1 month)


Serious lung disease, including


   pneumonia (< 1 month)


Abnormal pulmonary function


Acute myocardial infarction


Congestive heart failure (< 1 month)


History of inflammatory bowel disease


Medical patient at bed rest Age 61-74


Arthroscopic surgery


Major open surgery (> 45 min)


Laparoscopic surgery (> 45 min)


Malignancy


Confined to bed (> 72 hours)


Immobilizing plaster cast


Central venous access Age >= 75


History of VTE


Family history of VTE


Factor V Leiden


Prothrombin 04152G


Lupus anticoagulant


Anticardiolipin antibodies


Elevated serum homocysteine


Heparin-induced thrombocytopenia


Other congenital or acquired


   thrombophilia Stroke (< 1 month)


Elective arthroplasty


Hip, pelvis, or leg fracture


Acute spinal cord injury (< 1 month)








Prophylaxis Regimen











   Total Risk


Factor Score Risk Level Prophylaxis Regimen


 


0-1      Low Early ambulation


 


2 Moderate Order ONE of the following:


*Sequential Compression Device (SCD)


*Heparin 5000 units SQ BID


 


3-4 Higher Order ONE of the following medications:


*Heparin 5000 units SQ TID


*Enoxaparin/Lovenox 40 mg SQ daily (WT < 150 kg, CrCl > 30 mL/min)


*Enoxaparin/Lovenox 30 mg SQ daily (WT < 150 kg, CrCl > 10-29 mL/min)


*Enoxaparin/Lovenox 30 mg SQ BID (WT < 150 kg, CrCl > 30 mL/min)


AND/OR


*Sequential Compression Device (SCD)


 


5 or more Highest Order ONE of the following medications:


*Heparin 5000 units SQ TID (Preferred with Epidurals)


*Enoxaparin/Lovenox 40 mg SQ daily (WT < 150 kg, CrCl > 30 mL/min)


*Enoxaparin/Lovenox 30 mg SQ daily (WT < 150 kg, CrCl > 10-29 mL/min)


*Enoxaparin/Lovenox 30 mg SQ BID (WT < 150 kg, CrCl > 30 mL/min)


AND


*Sequential Compression Device (SCD)








 (Elaine Schaffer MD R1)





Assessment and Plan


Assessment and Plan


82 yr old F w/ hypertension, hyperlipidemia, CAD s/p stent in LAD admitted for 

HCAP


Code Status


Full Code


Discussed Condition With


Dr. Arceo


 (Elaine Schaffer MD R1)


Attending Attestation


THIS CASE WAS DISCUSSED WITH THE RESIDENT PHYSICIAN. I HAVE REVIEWED THE RECORD 

AND AGREE WITH THE ABOVE NOTE AND PLAN OF CARE AS WAS DISCUSSED. I HAVE 

AUTHORIZED THE ORDER FOR PLACEMENT IN OUT-PATIENT OBSERVATION STATUS. 


 (Zach Everett MD)


Problem List:  


(1) Healthcare-associated pneumonia


ICD Codes:  J18.9 - Pneumonia, unspecified organism


Status:  Acute


Plan:  1 week hx of coughing and flu-like symptoms with known sick contact. 

Recently hospitalized on . 


Afebrile


No leukocytosis on CBC


Lactic acid 0.9


Influenza negative


CXR demonstrates development of subsegmental infiltrates lower lateral left 

lung and right infrahilar region


Blood culture x2 pending, drawn after receiving abx in ED


Legionella and Pneumoccal urinary antigen pending





IV antibiotics


s/p Rocephin and Azithromycin in ED-discontinued due to patient's recent 

hospitalization


Start Vancomycin 1300mg IV q24h, pharmacy consulted for dosing


Start Zosyn 4.5 gm IV q6h





Solumedrol 40mg IV q12h


Duonebs ANH q4h, albuterol neb PRN


Tessalon pearls, mucinex, and magic mouth wash for cough/sore throat


Tylenol 650mg PO q4h for fever





(2) CAD (coronary artery disease)


ICD Codes:  I25.10 - Atherosclerotic heart disease of native coronary artery 

without angina pectoris


Status:  Chronic


Plan:  EKG: Sinus tachycardia at a rate of 100, occasional f premature complexes

, normal axis, lateral T-wave inversions, inferior Q waves, anterior precordial 

Q waves, no definite evidence of acute ischemia.  Compared to previous EKG from 

 of this year, lateral precordial T-wave inversions are less 

pronounced on this EKG.


Troponin at 0.09, baseline troponin ~0.06 per chart review





Trend troponin and EKG q6h


Monitor on cardiac telemetry





Continue Plavix 75 mg PO daily and Aspirin chew 81mg daily


Continue Imdur 30mg PO daily








(3) HTN (hypertension)


ICD Codes:  I10 - Essential (primary) hypertension


Status:  Chronic


Plan:  Continue Amlodipine 10mg PO daily, Losartan 100mg PO daily, metoprolol 

tartrate 25mg PO bid





(4) Hyperlipemia


ICD Codes:  E78.5 - Hyperlipidemia, unspecified


Status:  Chronic


Plan:  Continue atorvastatin 80mg PO hs





(5) Hypokalemia


ICD Codes:  E87.6 - Hypokalemia


Status:  Acute


Plan:  K+ 3.4


Replace orally





(6) Nutrition, metabolism, and development symptoms


ICD Codes:  R63.8 - Other symptoms and signs concerning food and fluid intake


Plan:  Diet: NPO except meds, awaiting 2nd set of troponin, pt passed bedside 

swallow eval


Fluids: NS 100mlshr 


vitals q4hr, monitor I &Os


DVT ppx: heparin 5000 units q8hr


 (Elaine Schaffer MD R1)











Elaine Schaffer MD R1 Feb 10, 2018 18:44


Zach Everett MD 2018 11:58

## 2018-02-10 NOTE — PD
HPI


Chief Complaint:  Cold / Flu Symptoms


Time Seen by Provider:  16:02


Travel History


International Travel<30 days:  No


Contact w/Intl Traveler<30days:  No


Traveled to known affect area:  No





History of Present Illness


HPI


89-year-old woman who presents to the emergency department complaining of 

feeling sick for the past week or so.  Family members been sick as well.  

Worsening fevers, productive cough, generalized weakness, not eating or 

drinking.Otherwise had been feeling well and healthy before this.  Since his 

been constant persistent and worsening.





History


Past Medical History


*** Narrative Medical


Hypertension


MI


Breast cancer


Hyperlipidemia


Menopausal:  Yes


:  13


Para:  12


Dilation and Curettage (D&C):  Yes





Social History


Alcohol Use:  No


Tobacco Use:  No





Allergies-Medications


(Allergen,Severity, Reaction):  


Coded Allergies:  


     lisinopril (Unverified  Allergy, Mild, COUGH, 2/10/18)


Reported Meds & Prescriptions





Reported Meds & Active Scripts


Active


Norvasc (Amlodipine Besylate) 10 Mg Tab 10 Mg PO DAILY


Metoprolol Tartrate 25 Mg Tab 25 Mg PO BID


Losartan (Losartan Potassium) 100 Mg Tab 100 Mg PO DAILY


Isosorbide Mononitrate 10 Mg Tab 30 Mg PO DAILY


     Take 2 doses 7 hours apart.


Plavix (Clopidogrel Bisulfate) 75 Mg Tab 75 Mg PO DAILY


Lipitor (Atorvastatin Calcium) 80 Mg Tab 80 Mg PO HS


Tylenol-Codeine #3 (Acetaminophen-Codeine) 300-30 mg Tab 1-2 Tab PO Q6H PRN


Reported


Ranitidine (Ranitidine HCl) 150 Mg Cap 150 Mg PO DAILY


Nitroglycerin SL (Nitroglycerin) 0.3 Mg Subl 0.3 Mg SL AS DIRECTED PRN


     ONE TABLET UNDER THE TONGUE AS NEEDED FOR CHEST PAIN, MAY REPEAT EVERY


     FIVE MINUTES FOR A TOTAL OF 3 DOSES OR CALL 911 IF NO RELIEF


Aspirin 81 Mg Chew 81 Mg CHEW DAILY








Review of Systems


Except as stated in HPI:  all other systems reviewed are Neg





Physical Exam


Narrative


GENERAL: 82-year-old woman, sleepy but responsive, no acute distress.


SKIN: Focused skin assessment warm/dry.


HEAD: Atraumatic. Normocephalic. 


EYES: Pupils equal and round. No scleral icterus. No injection or drainage. 


ENT: No nasal bleeding or discharge.  Mucous membranes pink and moist.


NECK: Trachea midline. No JVD. 


CARDIOVASCULAR: Regular rate and rhythm.  No murmur appreciated.


RESPIRATORY: No respiratory distress.  Coarse breath sounds posteriorly.


GASTROINTESTINAL: Abdomen soft, non-tender, nondistended. Hepatic and splenic 

margins not palpable. 


MUSCULOSKELETAL: No obvious deformities. No clubbing.  No cyanosis.  No edema. 


NEUROLOGICAL: Decreased alertness t. No obvious cranial nerve deficits.  Motor 

grossly within normal limits. Normal speech.


PSYCHIATRIC: Appropriate mood and affect; insight and judgment normal.





Data


Data


Last Documented VS





Vital Signs








  Date Time  Temp Pulse Resp B/P (MAP) Pulse Ox O2 Delivery O2 Flow Rate FiO2


 


2/10/18 15:37  116 33 162/70 (100) 95 Nasal Cannula 2.00 


 


2/10/18 15:14 98.8       








Orders





 Orders


Complete Blood Count With Diff (2/10/18 16:10)


Comprehensive Metabolic Panel (2/10/18 16:10)


B-Type Natriuretic Peptide (2/10/18 16:10)


Act Partial Throm Time (Ptt) (2/10/18 16:10)


Prothrombin Time / Inr (Pt) (2/10/18 16:10)


Troponin I (2/10/18 16:10)


Influenzae A/B Antigen (2/10/18 16:10)


Iv Access Insert/Monitor (2/10/18 16:10)


Electrocardiogram (2/10/18 16:10)


Ecg Monitoring (2/10/18 16:10)


Oximetry (2/10/18 16:10)


Oxygen Administration (2/10/18 16:10)


Chest, Single Ap (2/10/18 16:10)


Sodium Chloride 0.9% Flush (Ns Flush) (2/10/18 16:15)


Sodium Chlorid 0.9% 500 Ml Inj (Ns 500 M (2/10/18 16:15)


Lactic Acid Sepsis Protocol (2/10/18 16:15)


Ceftriaxone Inj (Rocephin Inj) (2/10/18 17:45)


Azithromycin Inj (Zithromax Inj) (2/10/18 17:45)


Albuterol-Ipratropium Neb (Duoneb Neb) (2/10/18 18:00)


Admit Order (Ed Use Only) (2/10/18 )





Labs





Laboratory Tests








Test


  2/10/18


16:10 2/10/18


16:38


 


White Blood Count 9.7 TH/MM3  


 


Red Blood Count 4.32 MIL/MM3  


 


Hemoglobin 13.0 GM/DL  


 


Hematocrit 38.5 %  


 


Mean Corpuscular Volume 89.0 FL  


 


Mean Corpuscular Hemoglobin 30.2 PG  


 


Mean Corpuscular Hemoglobin


Concent 33.9 % 


  


 


 


Red Cell Distribution Width 14.0 %  


 


Platelet Count 203 TH/MM3  


 


Mean Platelet Volume 8.7 FL  


 


Neutrophils (%) (Auto) 79.2 %  


 


Lymphocytes (%) (Auto) 10.5 %  


 


Monocytes (%) (Auto) 8.8 %  


 


Eosinophils (%) (Auto) 1.0 %  


 


Basophils (%) (Auto) 0.5 %  


 


Neutrophils # (Auto) 7.6 TH/MM3  


 


Lymphocytes # (Auto) 1.0 TH/MM3  


 


Monocytes # (Auto) 0.8 TH/MM3  


 


Eosinophils # (Auto) 0.1 TH/MM3  


 


Basophils # (Auto) 0.1 TH/MM3  


 


CBC Comment DIFF FINAL  


 


Differential Comment   


 


Prothrombin Time 11.1 SEC  


 


Prothromb Time International


Ratio 1.1 RATIO 


  


 


 


Activated Partial


Thromboplast Time 28.8 SEC 


  


 


 


Blood Urea Nitrogen 13 MG/DL  


 


Creatinine 0.90 MG/DL  


 


Random Glucose 115 MG/DL  


 


Total Protein 8.0 GM/DL  


 


Albumin 3.7 GM/DL  


 


Calcium Level 8.2 MG/DL  


 


Alkaline Phosphatase 60 U/L  


 


Aspartate Amino Transf


(AST/SGOT) 42 U/L 


  


 


 


Alanine Aminotransferase


(ALT/SGPT) 23 U/L 


  


 


 


Total Bilirubin 1.1 MG/DL  


 


Sodium Level 134 MEQ/L  


 


Potassium Level 3.4 MEQ/L  


 


Chloride Level 99 MEQ/L  


 


Carbon Dioxide Level 27.4 MEQ/L  


 


Anion Gap 8 MEQ/L  


 


Estimat Glomerular Filtration


Rate 60 ML/MIN 


  


 


 


Troponin I 0.09 NG/ML  


 


B-Type Natriuretic Peptide 149 PG/ML  


 


Lactic Acid Level  0.9 mmol/L 











[facility]


Medical Decision Making


Medical Screen Exam Complete:  Yes


Emergency Medical Condition:  Yes


Medical Record Reviewed:  Yes


Interpretation(s)


My review of EKG: Sinus tachycardia at a rate of 100, occasional f premature 

complexes, normal axis, lateral T-wave inversions, inferior Q waves, anterior 

precordial Q waves, no definite evidence of acute ischemia.  Compared to 

previous EKG from  of this year, lateral precordial T-wave inversions 

are less pronounced on this EKG.





LABS:


CBC is unremarkable.


CMP is overall unremarkable.


Troponin 0 0.09 consistent with previous


BNP is 149


Lactate 0.9


Coags unremarkable.





Chest x-ray: Interval development of subsegmental infiltrates in the lower 

lateral left lung and right infrahilar region.


Differential Diagnosis


Influenza, pneumonia, bronchitis, other


Narrative Course


Medical decision making





INITIAL claims an 82-year-old woman presents to the emergency department 

complaining of weakness, cough cold symptoms, fever.  She looks a little bit 

sluggish and lethargic.  She is able to awaken up and answer questions.  No 

significant respiratory distress.  Will check labs, flu, chest x-ray, reassess.





FINAL: 82-year-old woman, pneumonia, weak and lethargic, recommend admission.





Physician Communication


Physician Communication


Spoke with family medicine admitting team.





Diagnosis





 Primary Impression:  


 Pneumonia





Admitting Information


Admitting Physician Requests:  Admit











Elie Kumar MD Feb 10, 2018 16:19

## 2018-02-11 VITALS
RESPIRATION RATE: 18 BRPM | SYSTOLIC BLOOD PRESSURE: 147 MMHG | HEART RATE: 85 BPM | DIASTOLIC BLOOD PRESSURE: 67 MMHG | OXYGEN SATURATION: 97 % | TEMPERATURE: 97.3 F

## 2018-02-11 VITALS
DIASTOLIC BLOOD PRESSURE: 61 MMHG | OXYGEN SATURATION: 95 % | RESPIRATION RATE: 18 BRPM | HEART RATE: 73 BPM | TEMPERATURE: 97.8 F | SYSTOLIC BLOOD PRESSURE: 126 MMHG

## 2018-02-11 VITALS
HEART RATE: 79 BPM | TEMPERATURE: 97.4 F | RESPIRATION RATE: 18 BRPM | SYSTOLIC BLOOD PRESSURE: 124 MMHG | OXYGEN SATURATION: 96 % | DIASTOLIC BLOOD PRESSURE: 56 MMHG

## 2018-02-11 VITALS — HEART RATE: 79 BPM

## 2018-02-11 VITALS — RESPIRATION RATE: 18 BRPM | HEART RATE: 76 BPM | OXYGEN SATURATION: 97 %

## 2018-02-11 VITALS
HEART RATE: 80 BPM | SYSTOLIC BLOOD PRESSURE: 123 MMHG | OXYGEN SATURATION: 96 % | TEMPERATURE: 98.4 F | DIASTOLIC BLOOD PRESSURE: 57 MMHG | RESPIRATION RATE: 17 BRPM

## 2018-02-11 VITALS
SYSTOLIC BLOOD PRESSURE: 120 MMHG | OXYGEN SATURATION: 97 % | RESPIRATION RATE: 18 BRPM | DIASTOLIC BLOOD PRESSURE: 60 MMHG | TEMPERATURE: 97.8 F | HEART RATE: 65 BPM

## 2018-02-11 VITALS — OXYGEN SATURATION: 95 %

## 2018-02-11 VITALS — OXYGEN SATURATION: 96 %

## 2018-02-11 VITALS
SYSTOLIC BLOOD PRESSURE: 119 MMHG | TEMPERATURE: 98 F | RESPIRATION RATE: 18 BRPM | HEART RATE: 81 BPM | OXYGEN SATURATION: 96 % | DIASTOLIC BLOOD PRESSURE: 58 MMHG

## 2018-02-11 VITALS — HEART RATE: 75 BPM

## 2018-02-11 VITALS — HEART RATE: 78 BPM

## 2018-02-11 VITALS — HEART RATE: 73 BPM

## 2018-02-11 LAB
BASOPHILS # BLD AUTO: 0 TH/MM3 (ref 0–0.2)
BASOPHILS NFR BLD: 0.1 % (ref 0–2)
BUN SERPL-MCNC: 16 MG/DL (ref 7–18)
CALCIUM SERPL-MCNC: 7.6 MG/DL (ref 8.5–10.1)
CHLORIDE SERPL-SCNC: 104 MEQ/L (ref 98–107)
CREAT SERPL-MCNC: 0.79 MG/DL (ref 0.5–1)
EOSINOPHIL # BLD: 0 TH/MM3 (ref 0–0.4)
EOSINOPHIL NFR BLD: 0 % (ref 0–4)
ERYTHROCYTE [DISTWIDTH] IN BLOOD BY AUTOMATED COUNT: 14 % (ref 11.6–17.2)
GFR SERPLBLD BASED ON 1.73 SQ M-ARVRAT: 70 ML/MIN (ref 89–?)
GLUCOSE SERPL-MCNC: 192 MG/DL (ref 74–106)
HCO3 BLD-SCNC: 23 MEQ/L (ref 21–32)
HCT VFR BLD CALC: 37.2 % (ref 35–46)
HGB BLD-MCNC: 12.5 GM/DL (ref 11.6–15.3)
LYMPHOCYTES # BLD AUTO: 0.7 TH/MM3 (ref 1–4.8)
LYMPHOCYTES NFR BLD AUTO: 6.3 % (ref 9–44)
MCH RBC QN AUTO: 30.6 PG (ref 27–34)
MCHC RBC AUTO-ENTMCNC: 33.7 % (ref 32–36)
MCV RBC AUTO: 90.8 FL (ref 80–100)
MONOCYTE #: 0.3 TH/MM3 (ref 0–0.9)
MONOCYTES NFR BLD: 2.8 % (ref 0–8)
NEUTROPHILS # BLD AUTO: 10.8 TH/MM3 (ref 1.8–7.7)
NEUTROPHILS NFR BLD AUTO: 90.8 % (ref 16–70)
PLATELET # BLD: 182 TH/MM3 (ref 150–450)
PMV BLD AUTO: 8.5 FL (ref 7–11)
RBC # BLD AUTO: 4.09 MIL/MM3 (ref 4–5.3)
SODIUM SERPL-SCNC: 138 MEQ/L (ref 136–145)
WBC # BLD AUTO: 11.9 TH/MM3 (ref 4–11)

## 2018-02-11 RX ADMIN — HEPARIN SODIUM SCH UNITS: 10000 INJECTION, SOLUTION INTRAVENOUS; SUBCUTANEOUS at 20:26

## 2018-02-11 RX ADMIN — TAZOBACTAM SODIUM AND PIPERACILLIN SODIUM SCH MLS/HR: 500; 4 INJECTION, SOLUTION INTRAVENOUS at 09:00

## 2018-02-11 RX ADMIN — Medication SCH ML: at 20:26

## 2018-02-11 RX ADMIN — ASPIRIN 81 MG SCH MG: 81 TABLET ORAL at 09:00

## 2018-02-11 RX ADMIN — IPRATROPIUM BROMIDE AND ALBUTEROL SULFATE SCH AMPULE: .5; 3 SOLUTION RESPIRATORY (INHALATION) at 03:47

## 2018-02-11 RX ADMIN — ISOSORBIDE MONONITRATE SCH MG: 30 TABLET, EXTENDED RELEASE ORAL at 09:00

## 2018-02-11 RX ADMIN — Medication SCH ML: at 09:00

## 2018-02-11 RX ADMIN — METHYLPREDNISOLONE SODIUM SUCCINATE SCH MG: 40 INJECTION, POWDER, FOR SOLUTION INTRAMUSCULAR; INTRAVENOUS at 08:00

## 2018-02-11 RX ADMIN — METHYLPREDNISOLONE SODIUM SUCCINATE SCH MG: 40 INJECTION, POWDER, FOR SOLUTION INTRAMUSCULAR; INTRAVENOUS at 20:25

## 2018-02-11 RX ADMIN — IPRATROPIUM BROMIDE AND ALBUTEROL SULFATE SCH AMPULE: .5; 3 SOLUTION RESPIRATORY (INHALATION) at 07:45

## 2018-02-11 RX ADMIN — GUAIFENESIN PRN MG: 600 TABLET, EXTENDED RELEASE ORAL at 09:36

## 2018-02-11 RX ADMIN — ATORVASTATIN CALCIUM SCH MG: 80 TABLET, FILM COATED ORAL at 20:26

## 2018-02-11 RX ADMIN — METOPROLOL TARTRATE SCH MG: 25 TABLET, FILM COATED ORAL at 09:00

## 2018-02-11 RX ADMIN — LOSARTAN POTASSIUM SCH MG: 50 TABLET, FILM COATED ORAL at 09:00

## 2018-02-11 RX ADMIN — IPRATROPIUM BROMIDE AND ALBUTEROL SULFATE SCH AMPULE: .5; 3 SOLUTION RESPIRATORY (INHALATION) at 11:44

## 2018-02-11 RX ADMIN — HEPARIN SODIUM SCH UNITS: 10000 INJECTION, SOLUTION INTRAVENOUS; SUBCUTANEOUS at 04:48

## 2018-02-11 RX ADMIN — TAZOBACTAM SODIUM AND PIPERACILLIN SODIUM SCH MLS/HR: 500; 4 INJECTION, SOLUTION INTRAVENOUS at 03:30

## 2018-02-11 RX ADMIN — SODIUM CHLORIDE SCH MLS/HR: 900 INJECTION INTRAVENOUS at 14:54

## 2018-02-11 RX ADMIN — TAZOBACTAM SODIUM AND PIPERACILLIN SODIUM SCH MLS/HR: 500; 4 INJECTION, SOLUTION INTRAVENOUS at 21:33

## 2018-02-11 RX ADMIN — HEPARIN SODIUM SCH UNITS: 10000 INJECTION, SOLUTION INTRAVENOUS; SUBCUTANEOUS at 12:00

## 2018-02-11 RX ADMIN — METOPROLOL TARTRATE SCH MG: 25 TABLET, FILM COATED ORAL at 01:27

## 2018-02-11 RX ADMIN — BENZONATATE PRN MG: 100 CAPSULE ORAL at 09:36

## 2018-02-11 RX ADMIN — METOPROLOL TARTRATE SCH MG: 25 TABLET, FILM COATED ORAL at 21:33

## 2018-02-11 RX ADMIN — CLOPIDOGREL BISULFATE SCH MG: 75 TABLET, FILM COATED ORAL at 09:00

## 2018-02-11 RX ADMIN — TAZOBACTAM SODIUM AND PIPERACILLIN SODIUM SCH MLS/HR: 500; 4 INJECTION, SOLUTION INTRAVENOUS at 14:11

## 2018-02-11 NOTE — EKG
Date Performed: 02/10/2018       Time Performed: 22:10:03

 

PTAGE:      82 years

 

EKG:      Sinus rhythm 

 

 WITH PACs POSSIBLE INFERIOR MYOCARDIAL INFARCTION LATERAL ST/T WAVE CHANGES, POSSIBLE ISCHEMIA ABNOR
MAL ECG

 

PREVIOUS TRACING       : 02/10/2018 19.27 Since the prior tracing, there has been no significant castellanos


 

DOCTOR:   Justino Mccauley  Interpretating Date/Time  02/11/2018 17:05:53

## 2018-02-11 NOTE — MB
cc:

YAKELIN WARNER M.D., OTAKAR MD

****

 

 

DATE OF CONSULTATION:  2018.

 

REASON FOR CONSULTATION:

Abnormal EKG.  Nonspecific troponin elevation.

 

HISTORY OF PRESENT ILLNESS:

Ms. Trevino is a pleasant 82-year-old lady with history of hyperlipidemia,

coronary artery disease status post stenting of the left anterior descending

artery in 2016.  She presented again in May 2017 with nonspecific

chest discomfort and nonspecific troponin elevation.  She had a cardiac

catheterization by Dr. Kaur that revealed stent to be widely patent.  There

was some mild diffuse disease with moderate ostial diagonal branch disease

treated with medical therapy.  She has done well.  She comes now after she has

had the flu and her grandson who she takes care of also had flu-like symptoms.

She was having progressive fatigue and dyspnea on exertion, more than her usual

baseline (has COPD) with cough, whitish sputum, questionable fever at home.

Denies chest pain at the present time. Denies palpitations, dizziness or

syncope.  Denies orthopnea or paroxysmal nocturnal dyspnea. Has occasional

ankle swellings which has been going on and off for many years. Denies

claudications.

 

ALLERGIES:

QUESTIONABLE ALLERGY TO LISINOPRIL; HOWEVER, SHE HAS SIDE EFFECTS OF COUGH, SO

I AM NOT SURE IF THIS IS A TRUE ALLERGY.

 

FAMILY HISTORY:

No prior family history of diabetes or coronary artery disease.

 

PAST MEDICAL HISTORY / PAST SURGICAL HISTORY:

Includes that mentioned above.

1. History of hypertension.

2. History of breast cancer status post left mastectomy.

3. Bilateral cataract surgery in the past.

4. Left knee replacement.

5. Total hysterectomy.

 

MEDICATIONS AT HOME:

Her medications at home include the followin. Aspirin 81 milligrams daily.

2. Plavix 75 milligrams p.o. daily.

3. Atorvastatin 80 milligrams p.o. at bedtime.

4. Imdur 30 milligrams p.o. daily.

5. Nitroglycerin sublingual PRN with chest pain.

6. Metoprolol tartrate 25 milligrams p.o. twice a day.

7. Amlodipine 10 milligrams p.o. daily.

8. Losartan 100 milligrams p.o. daily.

9. Ranitidine 150 milligrams daily.

10. Acetaminophen / codeine 300/30 milligrams one to two tablets q. 6 hours PRN

with pain.

 

SOCIAL HISTORY:

Lives alone and takes care of an 80-year-old grandson. No history of smoking.

Denies EtOH abuse or recreational drug abuse.

 

REVIEW OF SYSTEMS:

A twelve-point system review was unremarkable except for what is mentioned in

the history of present illness.

 

PHYSICAL EXAMINATION:

GENERAL: On physical exam, an 82-year-old lady lying in bed in no apparent

distress. Alert and oriented times three. Answers questions appropriately.

VITAL SIGNS: Blood pressure is 147/67 mmHg, pulse of 85 beats per minute and

regular, respirations 16 per minute, afebrile.

HEAD, EYES, EARS, NOSE, THROAT: Head is normocephalic. Pupils are equal and

reactive. Throat is within normal limits.

NECK: The neck is supple. No carotid bruits. No thyromegaly.

LUNGS: A few crepitations at the bases bilaterally, more at the right lower

third. Somewhat diminished air entry and a very few expiratory rhonchi noted.

CARDIOVASCULAR: S1 and S2 are normal with a faint S4 gallop. A soft 1/6

systolic murmur across the precordium.

ABDOMEN: Obese, lax and nontender. Normoactive bowel sounds.  No organomegaly.

No masses felt.

EXTREMITIES:  Lower extremities with trace pedal/ankle edema.

PULSES:  2+ bilaterally. Dorsalis pedis pulse is barely felt.

RECTAL: Exam deferred.

NEUROLOGIC:  Grossly intact with no focal deficits.

 

EKGS:

EKG shows sinus rhythm with APCs, poor R-wave progression.  Nonspecific lateral

S-T-T wave changes, most of it was present before.  There are Q-waves noted in

the inferior wall as well. This has not significantly changed compared to prior

tracing, and that is compared to the tracing from 2016.

 

 

LABORATORY DATA:

Elevated white count today to 11.9, hemoglobin 12.5, platelets of 182,000.

 

Chemistries show a sodium of 138, potassium 3.7, BUN of 16, creatinine 0.79

with calcium of 7.6.

 

Troponin I shows nonspecific elevations at 0.09, 0.11 and 0.09 relatively flat.

 

 

BNP shows nonspecific elevation at 149.

 

IMAGING STUDIES:

Her chest x-ray shows left lower lobe infiltrate and right infrahilar

infiltrate as well.

 

ASSESSMENT AND RECOMMENDATIONS:

1. Coronary artery disease status post percutaneous coronary intervention in

the past in  and May of 2017 followed by a cath with no intervention

revealing widely patent stent in the left anterior descending.  At the present

time, she denies any chest pain. There is nonspecific troponin elevation with

no significant dynamic S-T changes compared to her previous EKG.

 

At this point, I would just continue her cardiac home medications. Further

ischemia evaluation is felt needed.  I will leave this up to Dr. Kaur who

will resume her cardiac care from tomorrow.

 

2. Hypertension: Continue home medications.

 

3. Hyperlipidemia: Continue atorvastatin.

 

4. Shortness of breath. Likely a combination of COPD exacerbation plus

underlying pneumonia for which she is being aggressively treated and will leave

this up to the medical team.

 

I thank you for the consultation.

 

 

                              _________________________________

                              MD CAROLINE Moura/SALAZAR

D:  2018/10:24 AM

T:  2018/11:21 AM

Visit #:  [id number]

Job #:  [id number]

## 2018-02-11 NOTE — EKG
Date Performed: 02/10/2018       Time Performed: 16:30:01

 

PTAGE:      82 years

 

EKG:      SINUS TACHYCARDIA WITH OCCASIONAL SUPRAVENTRICULAR PREMATURE COMPLEXES ANTERIOR MYOCARDIAL 
INFARCTION PROBABLE INFERIOR MYOCARDIAL INFARCTION MODERATE T-WAVE ABNORMALITY, CONSIDER LATERAL ISCH
EMIA ABNORMAL ECG

 

PREVIOUS TRACING       : 01/27/2018 01.24 Compared to prior tracing,  rate has increased

 

DOCTOR:   Justino Mccauley  Interpretating Date/Time  02/11/2018 00:41:28

## 2018-02-11 NOTE — EKG
Date Performed: 02/11/2018       Time Performed: 04:12:05

 

PTAGE:      82 years

 

EKG:      Sinus rhythm 

 

 SEPTAL MYOCARDIAL INFARCTION POSSIBLE INFERIOR MYOCARDIAL INFARCTION MODERATE T-WAVE ABNORMALITY, CO
NSIDER LATERAL ISCHEMIA ABNORMAL ECG

 

PREVIOUS TRACING       : 02/10/2018 22.10 Compared to prior tracing, T wave laterally are more promin
ent

 

DOCTOR:   Justino Mccauley  Interpretating Date/Time  02/11/2018 16:49:31

## 2018-02-11 NOTE — HHI.HP
HPI


Service


Family Medicine


Primary Care Physician


No Primary Care Physician


Admission Diagnosis





Pneumonia


Diagnoses:  


(1) Healthcare-associated pneumonia


(2) CAD (coronary artery disease)


(3) HTN (hypertension)


(4) Hyperlipemia


(5) Hypokalemia


(6) Nutrition, metabolism, and development symptoms


International Travel<30 Days:  No


Contact w/Intl Traveler<30days:  No


Known Affected Area:  No


History of Present Illness


Patient examined with resident medical team during morning rounds.  She 

continues to complain of a significant cough with generalized fatigue and 

malaise.  She states that her cough is nonproductive but it keeps her up at 

night and has not improved since being hospitalized.  She also complains of 

continued overall delays and fatigue.  She denies overt fevers but has felt 

chilled overnight.  She feels that the breathing treatments are helping, 

however does not think she has received any cough medication.  She denies any 

overt chest pain or palpitations, denies any vomiting but does endorse some 

nausea, she denies any productive sputum.





In summary, this is an 82 yr old F w/ hypertension, hyperlipidemia, CAD s/p 

stent in LAD who presents with "feeling sick." Accompanied by daughter and 

granddaughter. Patient recently hospitalized on  for chest pain due to 

hypertensive emergency and medication noncompliance, discharged on . 

Daughter states that "she has been sick all week with a bug." Her 8 yr old 

grandson that lives with her has been experiencing flu-like symptoms, but has 

been improving with antibiotics. Daughter went to check on patient today and 

found her on a chair unable to move due to fatigue. She reports that her mom 

lives in a trailer, which was extremely hot upon arrival due to air conditioner 

being off. States that patient was also coughing up white-clearish mucus and 

had 1 episode of vomiting, productive of "greenish-bile fluid".  Daughter 

became concerned and brought mom to ED. Patient is normally independent at 

baseline. Able to walk around on her own, rarely uses her walker, and able to 

dress and feed herself. Last meal was last night. No changes in appetite per 

patient.  Patient endorses sore throat and congestion. Denies fevers, runny nose

, diarrhea, CP, SOB, abdominal pain, and N/V.





Review of Systems


Constitutional:  COMPLAINS OF: Fatigue, Chills, Dizziness, DENIES: Fever


Respiratory:  COMPLAINS OF: Cough, Wheezing, Shortness of breath, DENIES: Apneas

, Hemoptysis, Sputum production


Cardiovascular:  COMPLAINS OF: Dyspnea on Exertion, DENIES: Chest pain, 

Palpitations, Lower Extremity Edema


Gastrointestinal:  COMPLAINS OF: Nausea, DENIES: Abdominal pain, Constipation, 

Diarrhea, Vomiting


Musculoskeletal:  DENIES: Joint pain





Past Family Social History


Past Medical History


HTN 


Hx of Breast CA


Hyperlipidemia  


CAD s/p LAD stent 17


Past Surgical History


Cardiac cath 17


Bilateral Cataract Surgery


Total Hysterectomy


Left Mastectomy


Left knee replacement


Allergies:  


Coded Allergies:  


     lisinopril (Unverified  Allergy, Mild, COUGH, 2/10/18)


Family History


No h/o DM or CAD


Social History


Lives alone with 8yr old grandson


Never smoker, admits to Snuff use 


Denies alcohol and illicit drug use





Physical Exam


Vital Signs





Vital Signs








  Date Time  Temp Pulse Resp B/P (MAP) Pulse Ox O2 Delivery O2 Flow Rate FiO2


 


18 11:37 97.8 65 18 120/60 (80) 97   


 


18 08:33 97.3 85 18 147/67 (93) 97   


 


18 07:47     95 Nasal Cannula 3.00 


 


18 06:39  75      


 


18 04:32 98.4 80 17 123/57 (79) 96   


 


2/10/18 23:52  92 18 163/72 (102) 95   


 


2/10/18 20:54 98.0 96 19 148/67 (94) 96   


 


2/10/18 19:56     95 Nasal Cannula 2.00 


 


2/10/18 19:28  96 18  100 Nasal Cannula 2.00 


 


2/10/18 18:25  108 26 158/67 (97) 95 Nasal Cannula 2.00 


 


2/10/18 17:57     97 Nasal Cannula 2.00 


 


2/10/18 15:37  116 33 162/70 (100) 95 Nasal Cannula 2.00 


 


2/10/18 15:14 98.8 110 18 189/92 (124) 93   








Physical Exam


GENERAL: Elderly, obese female lying in bed with nasal cannula in place


SKIN: No obvious skin breakdown, ecchymosis, or poor turgor


HEAD: Atraumatic. Normocephalic.


CARDIOVASCULAR: Regular rate and rhythm without murmurs


RESPIRATORY: Coarse breath sounds diffusely with expiratory wheezes


GASTROINTESTINAL: Abdomen soft, non-tender, nondistended.


MUSCULOSKELETAL: Extremities without clubbing, cyanosis, or edema. 


NEUROLOGICAL: Awake, alert, and oriented x3. No obvious neuro deficits.


Laboratory





Laboratory Tests








Test


  2/10/18


16:10 2/10/18


16:38 2/10/18


22:51 18


05:45


 


White Blood Count 9.7    11.9 


 


Red Blood Count 4.32    4.09 


 


Hemoglobin 13.0    12.5 


 


Hematocrit 38.5    37.2 


 


Mean Corpuscular Volume 89.0    90.8 


 


Mean Corpuscular Hemoglobin 30.2    30.6 


 


Mean Corpuscular Hemoglobin


Concent 33.9 


  


  


  33.7 


 


 


Red Cell Distribution Width 14.0    14.0 


 


Platelet Count 203    182 


 


Mean Platelet Volume 8.7    8.5 


 


Neutrophils (%) (Auto) 79.2    90.8 


 


Lymphocytes (%) (Auto) 10.5    6.3 


 


Monocytes (%) (Auto) 8.8    2.8 


 


Eosinophils (%) (Auto) 1.0    0.0 


 


Basophils (%) (Auto) 0.5    0.1 


 


Neutrophils # (Auto) 7.6    10.8 


 


Lymphocytes # (Auto) 1.0    0.7 


 


Monocytes # (Auto) 0.8    0.3 


 


Eosinophils # (Auto) 0.1    0.0 


 


Basophils # (Auto) 0.1    0.0 


 


CBC Comment DIFF FINAL    DIFF FINAL 


 


Differential Comment      


 


Prothrombin Time 11.1    


 


Prothromb Time International


Ratio 1.1 


  


  


  


 


 


Activated Partial


Thromboplast Time 28.8 


  


  


  


 


 


Blood Urea Nitrogen 13    16 


 


Creatinine 0.90    0.79 


 


Random Glucose 115    192 


 


Total Protein 8.0    


 


Albumin 3.7    


 


Calcium Level 8.2    7.6 


 


Alkaline Phosphatase 60    


 


Aspartate Amino Transf


(AST/SGOT) 42 


  


  


  


 


 


Alanine Aminotransferase


(ALT/SGPT) 23 


  


  


  


 


 


Total Bilirubin 1.1    


 


Sodium Level 134    138 


 


Potassium Level 3.4    3.7 


 


Chloride Level 99    104 


 


Carbon Dioxide Level 27.4    23.0 


 


Anion Gap 8    11 


 


Estimat Glomerular Filtration


Rate 60 


  


  


  70 


 


 


Troponin I 0.09   0.11  0.09 


 


B-Type Natriuretic Peptide 149    


 


Lactic Acid Level  0.9   














 Date/Time


Source Procedure


Growth Status


 


 


 18 03:43


Blood Peripheral Aerobic Blood Culture


Pending Received


 


 18 03:43


Blood Peripheral Anaerobic Blood Culture


Pending Received


 


 2/10/18 16:36


Nasal Washing Influenza Types A,B Antigen (DACIA) - Final


NEGATIVE FOR FLU A AND B ANTIGEN.... Complete








Result Diagram:  


18 0545                                                                   

             18 0545





Imaging





Last 24 hours Impressions








Chest X-Ray 2/10/18 1610 Signed





Impressions: 





 Service Date/Time:  Saturday, February 10, 2018 16:29 - CONCLUSION:  Interval 





 development of subsegmental infiltrates lower lateral left lung and right 





 infrahilar region.     Thomas J. Yuschok, MD Caprini VTE Risk Assessment


Caprinmariangel VTE Risk Assessment:  Mod/High Risk (score >= 2)


Caprini Risk Assessment Model











 Point Value = 1          Point Value = 2  Point Value = 3  Point Value = 5


 


Age 41-60


Minor surgery


BMI > 25 kg/m2


Swollen legs


Varicose veins


Pregnancy or postpartum


History of unexplained or recurrent


   spontaneous 


Oral contraceptives or hormone


   replacement


Sepsis (< 1 month)


Serious lung disease, including


   pneumonia (< 1 month)


Abnormal pulmonary function


Acute myocardial infarction


Congestive heart failure (< 1 month)


History of inflammatory bowel disease


Medical patient at bed rest Age 61-74


Arthroscopic surgery


Major open surgery (> 45 min)


Laparoscopic surgery (> 45 min)


Malignancy


Confined to bed (> 72 hours)


Immobilizing plaster cast


Central venous access Age >= 75


History of VTE


Family history of VTE


Factor V Leiden


Prothrombin 88434S


Lupus anticoagulant


Anticardiolipin antibodies


Elevated serum homocysteine


Heparin-induced thrombocytopenia


Other congenital or acquired


   thrombophilia Stroke (< 1 month)


Elective arthroplasty


Hip, pelvis, or leg fracture


Acute spinal cord injury (< 1 month)








Prophylaxis Regimen











   Total Risk


Factor Score Risk Level Prophylaxis Regimen


 


0-1      Low Early ambulation


 


2 Moderate Order ONE of the following:


*Sequential Compression Device (SCD)


*Heparin 5000 units SQ BID


 


3-4 Higher Order ONE of the following medications:


*Heparin 5000 units SQ TID


*Enoxaparin/Lovenox 40 mg SQ daily (WT < 150 kg, CrCl > 30 mL/min)


*Enoxaparin/Lovenox 30 mg SQ daily (WT < 150 kg, CrCl > 10-29 mL/min)


*Enoxaparin/Lovenox 30 mg SQ BID (WT < 150 kg, CrCl > 30 mL/min)


AND/OR


*Sequential Compression Device (SCD)


 


5 or more Highest Order ONE of the following medications:


*Heparin 5000 units SQ TID (Preferred with Epidurals)


*Enoxaparin/Lovenox 40 mg SQ daily (WT < 150 kg, CrCl > 30 mL/min)


*Enoxaparin/Lovenox 30 mg SQ daily (WT < 150 kg, CrCl > 10-29 mL/min)


*Enoxaparin/Lovenox 30 mg SQ BID (WT < 150 kg, CrCl > 30 mL/min)


AND


*Sequential Compression Device (SCD)











Assessment and Plan


Assessment and Plan


82 yr old F w/ hypertension, hyperlipidemia, CAD s/p stent in LAD admitted for 

HCAP


Problem List:  


(1) Healthcare-associated pneumonia


ICD Codes:  J18.9 - Pneumonia, unspecified organism


Status:  Acute


Plan:  Broad-spectrum antibiotics:


Vancomycin started 2/10/18


Zosyn started 2/10/18


   - s/p Rocephin and Azithromycin in ED-discontinued due to patient's recent 

hospitalization





Urine Legionella and pneumococcal antigens pending


Blood cultures pending





Influenza nasal wash negative for influenza a or B





CBC showing leukocytosis of 11.9 with neutrophil percentage of 90.8%


Lactic acid 0.9


CXR demonstrates development of subsegmental infiltrates lower lateral left 

lung and right infrahilar region





Adjunctive care:


Solumedrol 40mg IV q12h


Duonebs ANH q4h, albuterol neb PRN


Tessalon pearls, mucinex, and magic mouth wash for cough/sore throat


Tylenol 650mg PO q4h for fever





(2) CAD (coronary artery disease)


ICD Codes:  I25.10 - Atherosclerotic heart disease of native coronary artery 

without angina pectoris


Status:  Chronic


Plan:  EKG: Sinus tachycardia at a rate of 100, occasional f premature complexes

, normal axis, lateral T-wave inversions, inferior Q waves, anterior precordial 

Q waves, no definite evidence of acute ischemia.  Compared to previous EKG from 

 of this year, lateral precordial T-wave inversions are less 

pronounced on this EKG.


Troponin at 0.09 -> 0.11 -> 0.09


Monitor on cardiac telemetry


Cardiology to evaluate patient, known by Dr. Kaur





Continue Plavix 75 mg PO daily and Aspirin chew 81mg daily


Continue Imdur 30mg PO daily








(3) HTN (hypertension)


ICD Codes:  I10 - Essential (primary) hypertension


Status:  Chronic


Plan:  Continue Amlodipine 10mg PO daily, Losartan 100mg PO daily, metoprolol 

tartrate 25mg PO bid





(4) Hyperlipemia


ICD Codes:  E78.5 - Hyperlipidemia, unspecified


Status:  Chronic


Plan:  Continue atorvastatin 80mg PO hs





(5) Hypokalemia


ICD Codes:  E87.6 - Hypokalemia


Status:  Acute


Plan:  K+ 3.4 on arrival and is 3.7 status post replacement


Monitor and replete as needed





(6) Nutrition, metabolism, and development symptoms


ICD Codes:  R63.8 - Other symptoms and signs concerning food and fluid intake


Plan:  Diet: Heart healthy diet as tolerated


Fluids: NS 100mlshr 


vitals q4hr, monitor I &Os


DVT ppx: heparin 5000 units q8hr








Physician Certification


2 Midnight Certification Type:  Admission for Inpatient Services


Order for Inpatient Services


The services are ordered in accordance with Medicare regulations or non-

Medicare payer requirements, as applicable.  In the case of services not 

specified as inpatient-only, they are appropriately provided as inpatient 

services in accordance with the 2-midnight benchmark.


Estimated LOS (days):  2


2 days is the estimated time the patient will need to remain in the hospital, 

assuming treatment plan goals are met and no additional complications.


Post-Hospital Plan:  Not yet determined











Zach Everett MD 2018 11:57

## 2018-02-11 NOTE — EKG
Date Performed: 02/10/2018       Time Performed: 19:27:42

 

PTAGE:      82 years

 

EKG:      Sinus rhythm 

 

 WITH OCCASIONAL SUPRAVENTRICULAR PREMATURE COMPLEXES ANTERIOR MYOCARDIAL INFARCTION PROBABLE INFERIO
R MYOCARDIAL INFARCTION MODERATE T-WAVE ABNORMALITY, CONSIDER LATERAL ISCHEMIA ABNORMAL ECG

 

PREVIOUS TRACING       : 02/10/2018 16.30 Since the prior tracing, there has been no significant castellanos
[place]

 

DOCTOR:   Justino Mccauley  Interpretating Date/Time  02/11/2018 00:37:34

## 2018-02-12 VITALS
OXYGEN SATURATION: 95 % | DIASTOLIC BLOOD PRESSURE: 74 MMHG | SYSTOLIC BLOOD PRESSURE: 117 MMHG | TEMPERATURE: 97.8 F | HEART RATE: 80 BPM | RESPIRATION RATE: 17 BRPM

## 2018-02-12 VITALS
OXYGEN SATURATION: 97 % | HEART RATE: 77 BPM | SYSTOLIC BLOOD PRESSURE: 132 MMHG | RESPIRATION RATE: 19 BRPM | DIASTOLIC BLOOD PRESSURE: 66 MMHG | TEMPERATURE: 97.6 F

## 2018-02-12 VITALS
RESPIRATION RATE: 18 BRPM | HEART RATE: 79 BPM | TEMPERATURE: 97.9 F | SYSTOLIC BLOOD PRESSURE: 125 MMHG | OXYGEN SATURATION: 96 % | DIASTOLIC BLOOD PRESSURE: 65 MMHG

## 2018-02-12 VITALS
SYSTOLIC BLOOD PRESSURE: 122 MMHG | OXYGEN SATURATION: 98 % | HEART RATE: 89 BPM | RESPIRATION RATE: 20 BRPM | DIASTOLIC BLOOD PRESSURE: 60 MMHG | TEMPERATURE: 96.6 F

## 2018-02-12 VITALS
RESPIRATION RATE: 18 BRPM | DIASTOLIC BLOOD PRESSURE: 55 MMHG | HEART RATE: 79 BPM | OXYGEN SATURATION: 94 % | TEMPERATURE: 97.7 F | SYSTOLIC BLOOD PRESSURE: 114 MMHG

## 2018-02-12 VITALS — OXYGEN SATURATION: 97 %

## 2018-02-12 VITALS — OXYGEN SATURATION: 98 %

## 2018-02-12 VITALS — HEART RATE: 78 BPM

## 2018-02-12 LAB
ALBUMIN SERPL-MCNC: 2.9 GM/DL (ref 3.4–5)
ALP SERPL-CCNC: 47 U/L (ref 45–117)
ALT SERPL-CCNC: 20 U/L (ref 10–53)
AST SERPL-CCNC: 22 U/L (ref 15–37)
BASOPHILS # BLD AUTO: 0 TH/MM3 (ref 0–0.2)
BASOPHILS NFR BLD: 0.1 % (ref 0–2)
BILIRUB SERPL-MCNC: 0.5 MG/DL (ref 0.2–1)
BUN SERPL-MCNC: 21 MG/DL (ref 7–18)
CALCIUM SERPL-MCNC: 8.5 MG/DL (ref 8.5–10.1)
CHLORIDE SERPL-SCNC: 107 MEQ/L (ref 98–107)
CREAT SERPL-MCNC: 0.8 MG/DL (ref 0.5–1)
EOSINOPHIL # BLD: 0 TH/MM3 (ref 0–0.4)
EOSINOPHIL NFR BLD: 0 % (ref 0–4)
ERYTHROCYTE [DISTWIDTH] IN BLOOD BY AUTOMATED COUNT: 13.9 % (ref 11.6–17.2)
GFR SERPLBLD BASED ON 1.73 SQ M-ARVRAT: 69 ML/MIN (ref 89–?)
GLUCOSE SERPL-MCNC: 134 MG/DL (ref 74–106)
HCO3 BLD-SCNC: 26 MEQ/L (ref 21–32)
HCT VFR BLD CALC: 33 % (ref 35–46)
HGB BLD-MCNC: 11.1 GM/DL (ref 11.6–15.3)
LYMPHOCYTES # BLD AUTO: 1 TH/MM3 (ref 1–4.8)
LYMPHOCYTES NFR BLD AUTO: 6.1 % (ref 9–44)
MCH RBC QN AUTO: 30.1 PG (ref 27–34)
MCHC RBC AUTO-ENTMCNC: 33.6 % (ref 32–36)
MCV RBC AUTO: 89.7 FL (ref 80–100)
MONOCYTE #: 0.8 TH/MM3 (ref 0–0.9)
MONOCYTES NFR BLD: 4.8 % (ref 0–8)
NEUTROPHILS # BLD AUTO: 14.8 TH/MM3 (ref 1.8–7.7)
NEUTROPHILS NFR BLD AUTO: 89 % (ref 16–70)
PLATELET # BLD: 207 TH/MM3 (ref 150–450)
PMV BLD AUTO: 8.7 FL (ref 7–11)
PROT SERPL-MCNC: 6.7 GM/DL (ref 6.4–8.2)
RBC # BLD AUTO: 3.68 MIL/MM3 (ref 4–5.3)
SODIUM SERPL-SCNC: 140 MEQ/L (ref 136–145)
WBC # BLD AUTO: 16.7 TH/MM3 (ref 4–11)

## 2018-02-12 RX ADMIN — Medication SCH ML: at 08:39

## 2018-02-12 RX ADMIN — METHYLPREDNISOLONE SODIUM SUCCINATE SCH MG: 40 INJECTION, POWDER, FOR SOLUTION INTRAMUSCULAR; INTRAVENOUS at 08:38

## 2018-02-12 RX ADMIN — ASPIRIN 81 MG SCH MG: 81 TABLET ORAL at 08:39

## 2018-02-12 RX ADMIN — TAZOBACTAM SODIUM AND PIPERACILLIN SODIUM SCH MLS/HR: 500; 4 INJECTION, SOLUTION INTRAVENOUS at 21:57

## 2018-02-12 RX ADMIN — TAZOBACTAM SODIUM AND PIPERACILLIN SODIUM SCH MLS/HR: 500; 4 INJECTION, SOLUTION INTRAVENOUS at 14:51

## 2018-02-12 RX ADMIN — ATORVASTATIN CALCIUM SCH MG: 80 TABLET, FILM COATED ORAL at 21:58

## 2018-02-12 RX ADMIN — HEPARIN SODIUM SCH UNITS: 10000 INJECTION, SOLUTION INTRAVENOUS; SUBCUTANEOUS at 21:57

## 2018-02-12 RX ADMIN — CLOPIDOGREL BISULFATE SCH MG: 75 TABLET, FILM COATED ORAL at 08:39

## 2018-02-12 RX ADMIN — Medication SCH ML: at 21:58

## 2018-02-12 RX ADMIN — ISOSORBIDE MONONITRATE SCH MG: 30 TABLET, EXTENDED RELEASE ORAL at 08:38

## 2018-02-12 RX ADMIN — METOPROLOL TARTRATE SCH MG: 25 TABLET, FILM COATED ORAL at 08:38

## 2018-02-12 RX ADMIN — METHYLPREDNISOLONE SODIUM SUCCINATE SCH MG: 40 INJECTION, POWDER, FOR SOLUTION INTRAMUSCULAR; INTRAVENOUS at 21:57

## 2018-02-12 RX ADMIN — SODIUM CHLORIDE SCH MLS/HR: 900 INJECTION INTRAVENOUS at 15:25

## 2018-02-12 RX ADMIN — HEPARIN SODIUM SCH UNITS: 10000 INJECTION, SOLUTION INTRAVENOUS; SUBCUTANEOUS at 14:51

## 2018-02-12 RX ADMIN — TAZOBACTAM SODIUM AND PIPERACILLIN SODIUM SCH MLS/HR: 500; 4 INJECTION, SOLUTION INTRAVENOUS at 08:39

## 2018-02-12 RX ADMIN — GUAIFENESIN PRN MG: 600 TABLET, EXTENDED RELEASE ORAL at 08:39

## 2018-02-12 RX ADMIN — HEPARIN SODIUM SCH UNITS: 10000 INJECTION, SOLUTION INTRAVENOUS; SUBCUTANEOUS at 02:56

## 2018-02-12 RX ADMIN — BENZONATATE PRN MG: 100 CAPSULE ORAL at 22:41

## 2018-02-12 RX ADMIN — IPRATROPIUM BROMIDE AND ALBUTEROL SULFATE SCH AMPULE: .5; 3 SOLUTION RESPIRATORY (INHALATION) at 20:57

## 2018-02-12 RX ADMIN — IPRATROPIUM BROMIDE AND ALBUTEROL SULFATE SCH AMPULE: .5; 3 SOLUTION RESPIRATORY (INHALATION) at 23:44

## 2018-02-12 RX ADMIN — BENZONATATE PRN MG: 100 CAPSULE ORAL at 08:38

## 2018-02-12 RX ADMIN — METOPROLOL TARTRATE SCH MG: 25 TABLET, FILM COATED ORAL at 21:58

## 2018-02-12 RX ADMIN — LOSARTAN POTASSIUM SCH MG: 50 TABLET, FILM COATED ORAL at 08:39

## 2018-02-12 RX ADMIN — TAZOBACTAM SODIUM AND PIPERACILLIN SODIUM SCH MLS/HR: 500; 4 INJECTION, SOLUTION INTRAVENOUS at 02:56

## 2018-02-12 RX ADMIN — IPRATROPIUM BROMIDE AND ALBUTEROL SULFATE SCH AMPULE: .5; 3 SOLUTION RESPIRATORY (INHALATION) at 17:19

## 2018-02-12 NOTE — PD.CARD.PN
Subjective


Subjective Remarks


No CP, mild SOB, on O2





Objective


Medications





Current Medications








 Medications


  (Trade)  Dose


 Ordered  Sig/Kaitlin


 Route  Start Time


 Stop Time Status Last Admin


 


  (NS Flush)  2 ml  UNSCH  PRN


 IV FLUSH  2/10/18 18:45


     


 


 


  (NS Flush)  2 ml  BID


 IV FLUSH  2/10/18 21:00


    2/12/18 08:39


 


 


  (Norvasc)  10 mg  DAILY


 PO  2/10/18 19:15


    2/12/18 08:39


 


 


  (Aspirin Chew)  81 mg  DAILY


 CHEW  2/10/18 19:15


    2/12/18 08:39


 


 


  (Lipitor)  80 mg  HS


 PO  2/10/18 21:00


    2/11/18 20:26


 


 


  (Plavix)  75 mg  DAILY


 PO  2/10/18 19:15


    2/12/18 08:39


 


 


  (Imdur)  30 mg  DAILY


 PO  2/10/18 19:17


    2/12/18 08:38


 


 


  (Cozaar)  100 mg  DAILY


 PO  2/10/18 19:15


    2/12/18 08:39


 


 


  (Lopressor)  25 mg  BID


 PO  2/10/18 21:00


    2/12/18 08:38


 


 


 Piperacillin Sod/


 Tazobactam Sod  100 ml @ 


 200 mls/hr  Q6H


 IV  2/10/18 21:00


    2/12/18 08:39


 


 


 Pharmacy Profile


 Note  0 ml @ 0


 mls/hr  UNSCH


 OTHER  2/10/18 19:30


     


 


 


  (SoluMEDROL INJ)  40 mg  Q12H


 IV PUSH  2/10/18 20:00


    2/12/18 08:38


 


 


  (Tylenol)  650 mg  Q4H  PRN


 PO  2/10/18 19:30


     


 


 


  (Heparin Inj)  5,000 units  Q8H


 SQ  2/10/18 20:00


    2/12/18 02:56


 


 


  (Narcan Inj)  0.4 mg  UNSCH  PRN


 IV PUSH  2/10/18 19:30


     


 


 


 Vancomycin HCl


 1300 mg/Sodium


 Chloride  513 ml @ 


 250 mls/hr  Q24H


 IV  2/11/18 15:00


    2/11/18 14:54


 


 


 Miscellaneous


 Information  SPECIFIC LAB TO BE


 DRAWN:VANCO TROUGH


 DATE TO BE DRUli..  ONCE  ONCE


 .XX  2/13/18 14:45


 2/13/18 14:46   


 


 


  (Tessalon)  100 mg  TID  PRN


 PO  2/10/18 21:00


    2/12/18 08:38


 


 


  (Mucinex Er)  600 mg  BID  PRN


 PO  2/10/18 21:00


    2/12/18 08:39


 


 


  (Magic Mouthwash


 Adult Liq)  5 ml  QID  PRN


 SWISH-SWAL  2/10/18 21:00


     


 


 


  (Duoneb Neb)  1 ampule  Q4HR  NEB


 NEB  2/12/18 12:00


     


 








Vital Signs / I&O





Vital Signs








  Date Time  Temp Pulse Resp B/P (MAP) Pulse Ox O2 Delivery O2 Flow Rate FiO2


 


2/12/18 11:32 97.7 79 18 114/55 (74) 94   


 


2/12/18 07:26 97.8 80 17 117/74 (88) 95   


 


2/12/18 04:00  78      


 


2/12/18 03:34 97.9 79 18 125/65 (85) 96   


 


2/11/18 23:53  73      


 


2/11/18 23:29 97.8 73 18 126/61 (82) 95   


 


2/11/18 21:32  76 18  97   


 


2/11/18 20:46 98.0 81 18 119/58 (78) 96   


 


2/11/18 20:24     96 Nasal Cannula 2.00 


 


2/11/18 20:05  78      


 


2/11/18 16:28  79      


 


2/11/18 16:27 97.4 79 18 124/56 (78) 96   














I/O      


 


 2/11/18 2/11/18 2/11/18 2/12/18 2/12/18 2/12/18





 07:00 15:00 23:00 07:00 15:00 23:00


 


Intake Total  200 ml  500 ml 613 ml 


 


Balance  200 ml  500 ml 613 ml 


 


      


 


Intake Oral    500 ml  


 


IV Total  200 ml   613 ml 


 


# Voids    1  








Physical Exam


GENERAL: In NAD.


SKIN: Warm and dry.


HEAD: Normocephalic.


EYES: No scleral icterus. No injection or drainage. 


NECK: Supple, trachea midline. No JVD or lymphadenopathy.


CARDIOVASCULAR: Regular rate and rhythm without murmurs, gallops, or rubs. 


RESPIRATORY: Breath sounds equal bilaterally. No accessory muscle use. Few 

rhonchi.


GASTROINTESTINAL: Abdomen soft, non-tender, nondistended. 


MUSCULOSKELETAL: No cyanosis, or edema. 





Laboratory





Laboratory Tests








Test


  2/12/18


08:20


 


White Blood Count 16.7 TH/MM3 


 


Red Blood Count 3.68 MIL/MM3 


 


Hemoglobin 11.1 GM/DL 


 


Hematocrit 33.0 % 


 


Mean Corpuscular Volume 89.7 FL 


 


Mean Corpuscular Hemoglobin 30.1 PG 


 


Mean Corpuscular Hemoglobin


Concent 33.6 % 


 


 


Red Cell Distribution Width 13.9 % 


 


Platelet Count 207 TH/MM3 


 


Mean Platelet Volume 8.7 FL 


 


Neutrophils (%) (Auto) 89.0 % 


 


Lymphocytes (%) (Auto) 6.1 % 


 


Monocytes (%) (Auto) 4.8 % 


 


Eosinophils (%) (Auto) 0.0 % 


 


Basophils (%) (Auto) 0.1 % 


 


Neutrophils # (Auto) 14.8 TH/MM3 


 


Lymphocytes # (Auto) 1.0 TH/MM3 


 


Monocytes # (Auto) 0.8 TH/MM3 


 


Eosinophils # (Auto) 0.0 TH/MM3 


 


Basophils # (Auto) 0.0 TH/MM3 


 


CBC Comment DIFF FINAL 


 


Differential Comment  


 


Blood Urea Nitrogen 21 MG/DL 


 


Creatinine 0.80 MG/DL 


 


Random Glucose 134 MG/DL 


 


Total Protein 6.7 GM/DL 


 


Albumin 2.9 GM/DL 


 


Calcium Level 8.5 MG/DL 


 


Alkaline Phosphatase 47 U/L 


 


Aspartate Amino Transf


(AST/SGOT) 22 U/L 


 


 


Alanine Aminotransferase


(ALT/SGPT) 20 U/L 


 


 


Total Bilirubin 0.5 MG/DL 


 


Sodium Level 140 MEQ/L 


 


Potassium Level 3.6 MEQ/L 


 


Chloride Level 107 MEQ/L 


 


Carbon Dioxide Level 26.0 MEQ/L 


 


Anion Gap 7 MEQ/L 


 


Estimat Glomerular Filtration


Rate 69 ML/MIN 


 











Assessment and Plan


Problem List:  


(1) Dyspnea on exertion


ICD Codes:  R06.09 - Other forms of dyspnea


Status:  Acute


(2) CAD (coronary artery disease)


ICD Codes:  I25.10 - Atherosclerotic heart disease of native coronary artery 

without angina pectoris


Status:  Chronic


(3) Presence of stent in LAD coronary artery


ICD Codes:  Z95.5 - Presence of coronary angioplasty implant and graft


Status:  Acute


(4) Pneumonia


ICD Codes:  J18.9 - Pneumonia, unspecified organism


Status:  Acute


(5) HTN (hypertension)


ICD Codes:  I10 - Essential (primary) hypertension


Status:  Chronic


(6) Hyperlipemia


ICD Codes:  E78.5 - Hyperlipidemia, unspecified


Status:  Chronic


(7) COPD (chronic obstructive pulmonary disease)


ICD Codes:  J44.9 - Chronic obstructive pulmonary disease, unspecified


Status:  Acute


Assessment and Plan


No angina, still with mild dyspnea. Cardiac enzymes without any trend. Continue 

tx for pneumonia and COPD exacerbation. Continue tx for HTN and CAD. Increase 

activity.











Asha Kaur MD Feb 12, 2018 14:08

## 2018-02-12 NOTE — HHI.FPPN
Subjective


Remarks


No acute events overnight. Pt sitting up in bed, eating breakfast. Daughter at 

bedside. States that she is feeling better, still has productive cough of 

yellowish-clear sputum. Currently on 2L O2 NC. Otherwise, no other complaints 

this AM. She denies CP, SOB, abdominal pain, and N/V.





Objective


Vitals





Vital Signs








  Date Time  Temp Pulse Resp B/P (MAP) Pulse Ox O2 Delivery O2 Flow Rate FiO2


 


2/12/18 07:26 97.8 80 17 117/74 (88) 95   


 


2/12/18 04:00  78      


 


2/12/18 03:34 97.9 79 18 125/65 (85) 96   


 


2/11/18 23:53  73      


 


2/11/18 23:29 97.8 73 18 126/61 (82) 95   


 


2/11/18 21:32  76 18  97   


 


2/11/18 20:46 98.0 81 18 119/58 (78) 96   


 


2/11/18 20:24     96 Nasal Cannula 2.00 


 


2/11/18 20:05  78      


 


2/11/18 16:28  79      


 


2/11/18 16:27 97.4 79 18 124/56 (78) 96   


 


2/11/18 11:37 97.8 65 18 120/60 (80) 97   














I/O      


 


 2/11/18 2/11/18 2/11/18 2/12/18 2/12/18 2/12/18





 07:00 15:00 23:00 07:00 15:00 23:00


 


Intake Total  200 ml  500 ml 613 ml 


 


Balance  200 ml  500 ml 613 ml 


 


      


 


Intake Oral    500 ml  


 


IV Total  200 ml   613 ml 


 


# Voids    1  








Result Diagram:  


2/12/18 0820                                                                   

             2/11/18 0545





Objective Remarks


GENERAL:  elderly female, lying in bed, currently on 2L NC, in NAD


SKIN: No rashes, ecchymoses or lesions. Cool and dry.


HEAD: Atraumatic. Normocephalic. No temporal or scalp tenderness.


EYES: Pupils equal round and reactive. Extraocular motions intact. No scleral 

icterus. No injection or drainage. 


ENT: Nose without bleeding, purulent drainage or septal hematoma. Throat 

without erythema, tonsillar hypertrophy or exudate. Uvula midline. Airway 

patent.


NECK: Trachea midline. No JVD or lymphadenopathy. Supple, nontender, no 

meningeal signs.


CARDIOVASCULAR: RRR, no m/r/g


RESPIRATORY: decreased breath sounds on L compared to R, mild wheezes 

throughout all lung fields 


GASTROINTESTINAL: Abdomen soft, non-tender, nondistended. No hepato-splenomegaly

, or palpable masses. No guarding.


MUSCULOSKELETAL: Extremities without clubbing, cyanosis, or edema. No joint 

tenderness, effusion, or edema noted. No calf tenderness. 


NEUROLOGICAL: Awake, alert, and oriented x3. No neuro deficits.





A/P


Assessment and Plan


82 yr old F w/ hypertension, hyperlipidemia, CAD s/p stent in LAD admitted for 

HCAP


Discharge Planning


Home health PT


Problem List:  


(1) Healthcare-associated pneumonia


ICD Codes:  J18.9 - Pneumonia, unspecified organism


Status:  Acute


Plan:  Broad-spectrum antibiotics:


Continue Vancomycin started 2/10/18


Continue Zosyn started 2/10/18


   - s/p Rocephin and Azithromycin in ED-discontinued due to patient's recent 

hospitalization





Urine Legionella and pneumococcal antigens pending


Blood cultures pending





Influenza nasal wash negative for influenza a or B





CBC showing leukocytosis of 11.9 with neutrophil percentage of 90.8%


Lactic acid 0.9


CXR demonstrates development of subsegmental infiltrates lower lateral left 

lung and right infrahilar region





Adjunctive care:


Solumedrol 40mg IV q12h


Duonebs ANH q4h, albuterol neb PRN


Tessalon pearls, mucinex, and magic mouth wash for cough/sore throat


Tylenol 650mg PO q4h for fever





(2) CAD (coronary artery disease)


ICD Codes:  I25.10 - Atherosclerotic heart disease of native coronary artery 

without angina pectoris


Status:  Chronic


Plan:  EKG: Sinus tachycardia at a rate of 100, occasional f premature complexes

, normal axis, lateral T-wave inversions, inferior Q waves, anterior precordial 

Q waves, no definite evidence of acute ischemia.  Compared to previous EKG from 

January 27 of this year, lateral precordial T-wave inversions are less 

pronounced on this EKG.


Troponin at 0.09 -> 0.11 -> 0.09


Monitor on cardiac telemetry


Cardiology to evaluate patient, known by Dr. Kaur





Continue Plavix 75 mg PO daily and Aspirin chew 81mg daily


Continue Imdur 30mg PO daily








(3) HTN (hypertension)


ICD Codes:  I10 - Essential (primary) hypertension


Status:  Chronic


Plan:  Continue Amlodipine 10mg PO daily, Losartan 100mg PO daily, metoprolol 

tartrate 25mg PO bid





(4) Hyperlipemia


ICD Codes:  E78.5 - Hyperlipidemia, unspecified


Status:  Chronic


Plan:  Continue atorvastatin 80mg PO hs





(5) Hypokalemia


ICD Codes:  E87.6 - Hypokalemia


Status:  Resolved


Plan:  K+ 3.4 on arrival and is 3.7 status post replacement


Monitor and replete as needed





(6) Nutrition, metabolism, and development symptoms


ICD Codes:  R63.8 - Other symptoms and signs concerning food and fluid intake


Plan:  Diet: Heart healthy diet as tolerated


Fluids:not indicated at this time


vitals q4hr, monitor I &Os


DVT ppx: heparin 5000 units q8hr














Elaine Schaffer MD R1 Feb 12, 2018 10:21

## 2018-02-13 ENCOUNTER — HOSPITAL ENCOUNTER (OUTPATIENT)
Dept: HOSPITAL 17 - NEPC | Age: 83
Setting detail: OBSERVATION
LOS: 1 days | Discharge: HOME | End: 2018-02-14
Attending: FAMILY MEDICINE | Admitting: FAMILY MEDICINE
Payer: MEDICARE

## 2018-02-13 VITALS
HEART RATE: 80 BPM | OXYGEN SATURATION: 96 % | DIASTOLIC BLOOD PRESSURE: 63 MMHG | RESPIRATION RATE: 21 BRPM | SYSTOLIC BLOOD PRESSURE: 125 MMHG | TEMPERATURE: 97.5 F

## 2018-02-13 VITALS
DIASTOLIC BLOOD PRESSURE: 60 MMHG | TEMPERATURE: 96.8 F | SYSTOLIC BLOOD PRESSURE: 121 MMHG | HEART RATE: 79 BPM | RESPIRATION RATE: 20 BRPM | OXYGEN SATURATION: 96 %

## 2018-02-13 VITALS
OXYGEN SATURATION: 96 % | HEART RATE: 76 BPM | DIASTOLIC BLOOD PRESSURE: 54 MMHG | RESPIRATION RATE: 21 BRPM | TEMPERATURE: 96 F | SYSTOLIC BLOOD PRESSURE: 116 MMHG

## 2018-02-13 VITALS
RESPIRATION RATE: 18 BRPM | DIASTOLIC BLOOD PRESSURE: 65 MMHG | SYSTOLIC BLOOD PRESSURE: 125 MMHG | TEMPERATURE: 97.4 F | HEART RATE: 88 BPM | OXYGEN SATURATION: 93 %

## 2018-02-13 VITALS — BODY MASS INDEX: 31.89 KG/M2 | HEIGHT: 66 IN | WEIGHT: 198.42 LBS

## 2018-02-13 VITALS
TEMPERATURE: 97.7 F | DIASTOLIC BLOOD PRESSURE: 72 MMHG | OXYGEN SATURATION: 95 % | SYSTOLIC BLOOD PRESSURE: 156 MMHG | HEART RATE: 78 BPM | RESPIRATION RATE: 32 BRPM

## 2018-02-13 VITALS — OXYGEN SATURATION: 94 %

## 2018-02-13 VITALS — HEART RATE: 84 BPM

## 2018-02-13 VITALS — HEART RATE: 89 BPM

## 2018-02-13 DIAGNOSIS — Z79.899: ICD-10-CM

## 2018-02-13 DIAGNOSIS — J44.9: ICD-10-CM

## 2018-02-13 DIAGNOSIS — Z90.710: ICD-10-CM

## 2018-02-13 DIAGNOSIS — R94.31: ICD-10-CM

## 2018-02-13 DIAGNOSIS — Z96.652: ICD-10-CM

## 2018-02-13 DIAGNOSIS — I25.10: ICD-10-CM

## 2018-02-13 DIAGNOSIS — Z95.5: ICD-10-CM

## 2018-02-13 DIAGNOSIS — I10: ICD-10-CM

## 2018-02-13 DIAGNOSIS — J18.9: Primary | ICD-10-CM

## 2018-02-13 DIAGNOSIS — E78.5: ICD-10-CM

## 2018-02-13 DIAGNOSIS — Z85.3: ICD-10-CM

## 2018-02-13 DIAGNOSIS — Z99.81: ICD-10-CM

## 2018-02-13 DIAGNOSIS — I25.2: ICD-10-CM

## 2018-02-13 LAB
BUN SERPL-MCNC: 18 MG/DL (ref 7–18)
CALCIUM SERPL-MCNC: 8.1 MG/DL (ref 8.5–10.1)
CHLORIDE SERPL-SCNC: 107 MEQ/L (ref 98–107)
CREAT SERPL-MCNC: 0.83 MG/DL (ref 0.5–1)
ERYTHROCYTE [DISTWIDTH] IN BLOOD BY AUTOMATED COUNT: 14.2 % (ref 11.6–17.2)
GFR SERPLBLD BASED ON 1.73 SQ M-ARVRAT: 66 ML/MIN (ref 89–?)
GLUCOSE SERPL-MCNC: 173 MG/DL (ref 74–106)
HCO3 BLD-SCNC: 28 MEQ/L (ref 21–32)
HCT VFR BLD CALC: 32 % (ref 35–46)
HGB BLD-MCNC: 10.9 GM/DL (ref 11.6–15.3)
MCH RBC QN AUTO: 30.5 PG (ref 27–34)
MCHC RBC AUTO-ENTMCNC: 34.2 % (ref 32–36)
MCV RBC AUTO: 89.4 FL (ref 80–100)
PLATELET # BLD: 221 TH/MM3 (ref 150–450)
PMV BLD AUTO: 8.7 FL (ref 7–11)
RBC # BLD AUTO: 3.58 MIL/MM3 (ref 4–5.3)
SODIUM SERPL-SCNC: 141 MEQ/L (ref 136–145)
WBC # BLD AUTO: 17.8 TH/MM3 (ref 4–11)

## 2018-02-13 PROCEDURE — 99283 EMERGENCY DEPT VISIT LOW MDM: CPT

## 2018-02-13 PROCEDURE — 94640 AIRWAY INHALATION TREATMENT: CPT

## 2018-02-13 PROCEDURE — 94664 DEMO&/EVAL PT USE INHALER: CPT

## 2018-02-13 PROCEDURE — 71045 X-RAY EXAM CHEST 1 VIEW: CPT

## 2018-02-13 PROCEDURE — G0378 HOSPITAL OBSERVATION PER HR: HCPCS

## 2018-02-13 PROCEDURE — 96372 THER/PROPH/DIAG INJ SC/IM: CPT

## 2018-02-13 PROCEDURE — 93005 ELECTROCARDIOGRAM TRACING: CPT

## 2018-02-13 RX ADMIN — IPRATROPIUM BROMIDE AND ALBUTEROL SULFATE SCH AMPULE: .5; 3 SOLUTION RESPIRATORY (INHALATION) at 11:42

## 2018-02-13 RX ADMIN — ASPIRIN 81 MG SCH MG: 81 TABLET ORAL at 08:45

## 2018-02-13 RX ADMIN — METOPROLOL TARTRATE SCH MG: 25 TABLET, FILM COATED ORAL at 08:45

## 2018-02-13 RX ADMIN — IPRATROPIUM BROMIDE AND ALBUTEROL SULFATE SCH AMPULE: .5; 3 SOLUTION RESPIRATORY (INHALATION) at 03:36

## 2018-02-13 RX ADMIN — IPRATROPIUM BROMIDE AND ALBUTEROL SULFATE SCH AMPULE: .5; 3 SOLUTION RESPIRATORY (INHALATION) at 07:24

## 2018-02-13 RX ADMIN — CLOPIDOGREL BISULFATE SCH MG: 75 TABLET, FILM COATED ORAL at 08:45

## 2018-02-13 RX ADMIN — TAZOBACTAM SODIUM AND PIPERACILLIN SODIUM SCH MLS/HR: 500; 4 INJECTION, SOLUTION INTRAVENOUS at 08:45

## 2018-02-13 RX ADMIN — TAZOBACTAM SODIUM AND PIPERACILLIN SODIUM SCH MLS/HR: 500; 4 INJECTION, SOLUTION INTRAVENOUS at 03:18

## 2018-02-13 RX ADMIN — Medication SCH ML: at 08:47

## 2018-02-13 RX ADMIN — LOSARTAN POTASSIUM SCH MG: 50 TABLET, FILM COATED ORAL at 08:45

## 2018-02-13 RX ADMIN — HEPARIN SODIUM SCH UNITS: 10000 INJECTION, SOLUTION INTRAVENOUS; SUBCUTANEOUS at 12:00

## 2018-02-13 RX ADMIN — METHYLPREDNISOLONE SODIUM SUCCINATE SCH MG: 40 INJECTION, POWDER, FOR SOLUTION INTRAMUSCULAR; INTRAVENOUS at 08:45

## 2018-02-13 RX ADMIN — HEPARIN SODIUM SCH UNITS: 10000 INJECTION, SOLUTION INTRAVENOUS; SUBCUTANEOUS at 05:14

## 2018-02-13 RX ADMIN — ISOSORBIDE MONONITRATE SCH MG: 30 TABLET, EXTENDED RELEASE ORAL at 08:45

## 2018-02-13 NOTE — HHI.DS
Discharge Summary


Admission Date


Feb 11, 2018 at 08:17


Admitting Diagnosis





Pneumonia





(1) Healthcare-associated pneumonia


Plan:   Antibiotics:


Patient will discharge on home Levaquin 750mg daily for 4 days


Discontinue Vancomycin started 2/10/18


Discontinue Zosyn started 2/10/18


   - s/p Rocephin and Azithromycin in ED-discontinued due to patient's recent 

hospitalization





Urine Legionella and pneumococcal antigens pending


Blood cultures NGTD





Influenza nasal wash negative for influenza A or B





CBC showing leukocytosis of 11.9 with neutrophil percentage of 90.8%


Lactic acid 0.9


CXR demonstrates development of subsegmental infiltrates lower lateral left 

lung and right infrahilar region





Adjunctive care:


Solumedrol 40mg IV q12h, will be discharge on medrol dose pack


Duonebs ANH q4h, albuterol neb PRN


Tessalon pearls, mucinex, and magic mouth wash for cough/sore throat


Tylenol 650mg PO q4h for fever


ICD Codes:  J18.9 - Pneumonia, unspecified organism


Status:  Acute


(2) CAD (coronary artery disease)


Plan:  EKG: Sinus tachycardia at a rate of 100, occasional f premature complexes

, normal axis, lateral T-wave inversions, inferior Q waves, anterior precordial 

Q waves, no definite evidence of acute ischemia.  Compared to previous EKG from 

January 27 of this year, lateral precordial T-wave inversions are less 

pronounced on this EKG.


Troponin at 0.09 -> 0.11 -> 0.09


Monitor on cardiac telemetry





Evaluated by Dr. Kaur, continue tx for pneumonia as above, f/u outpatient 





Continue Plavix 75 mg PO daily and Aspirin chew 81mg daily


Continue Imdur 30mg PO daily


ICD Codes:  I25.10 - Atherosclerotic heart disease of native coronary artery 

without angina pectoris


Status:  Chronic


(3) HTN (hypertension)


Plan:  Continue Amlodipine 10mg PO daily, Losartan 100mg PO daily, metoprolol 

tartrate 25mg PO bid


ICD Codes:  I10 - Essential (primary) hypertension


Status:  Chronic


(4) Hyperlipemia


Plan:  Continue atorvastatin 80mg PO hs


ICD Codes:  E78.5 - Hyperlipidemia, unspecified


Status:  Chronic


(5) Hypokalemia


Plan:  K+ 3.4 on arrival and is 3.7 status post replacement


Monitor and replete as needed


ICD Codes:  E87.6 - Hypokalemia


Status:  Resolved


(6) Nutrition, metabolism, and development symptoms


Plan:  Diet: Heart healthy diet as tolerated


Fluids:not indicated at this time


vitals q4hr, monitor I &Os


DVT ppx: heparin 5000 units q8hr


ICD Codes:  R63.8 - Other symptoms and signs concerning food and fluid intake


Brief History


Patient examined with resident medical team during morning rounds.  She 

continues to complain of a significant cough with generalized fatigue and 

malaise.  She states that her cough is nonproductive but it keeps her up at 

night and has not improved since being hospitalized.  She also complains of 

continued overall delays and fatigue.  She denies overt fevers but has felt 

chilled overnight.  She feels that the breathing treatments are helping, 

however does not think she has received any cough medication.  She denies any 

overt chest pain or palpitations, denies any vomiting but does endorse some 

nausea, she denies any productive sputum.





In summary, this is an 82 yr old F w/ hypertension, hyperlipidemia, CAD s/p 

stent in LAD who presents with "feeling sick." Accompanied by daughter and 

granddaughter. Patient recently hospitalized on 1/27 for chest pain due to 

hypertensive emergency and medication noncompliance, discharged on 1/29. 

Daughter states that "she has been sick all week with a bug." Her 8 yr old 

grandson that lives with her has been experiencing flu-like symptoms, but has 

been improving with antibiotics. Daughter went to check on patient today and 

found her on a chair unable to move due to fatigue. She reports that her mom 

lives in a trailer, which was extremely hot upon arrival due to air conditioner 

being off. States that patient was also coughing up white-clearish mucus and 

had 1 episode of vomiting, productive of "greenish-bile fluid".  Daughter 

became concerned and brought mom to ED. Patient is normally independent at 

baseline. Able to walk around on her own, rarely uses her walker, and able to 

dress and feed herself. Last meal was last night. No changes in appetite per 

patient.  Patient endorses sore throat and congestion. Denies fevers, runny nose

, diarrhea, CP, SOB, abdominal pain, and N/V.


CBC/BMP:  


2/13/18 0640                                                                   

             2/13/18 0640





Significant Findings





Laboratory Tests








Test


  2/10/18


16:10 2/10/18


16:38 2/10/18


22:51 2/11/18


05:45


 


Neutrophils (%) (Auto)


  79.2 %


(16.0-70.0) 


  


  90.8 %


(16.0-70.0)


 


Monocytes (%) (Auto)


  8.8 %


(0.0-8.0) 


  


  


 


 


Random Glucose


  115 MG/DL


() 


  


  192 MG/DL


()


 


Calcium Level


  8.2 MG/DL


(8.5-10.1) 


  


  7.6 MG/DL


(8.5-10.1)


 


Aspartate Amino Transf


(AST/SGOT) 42 U/L (15-37) 


  


  


  


 


 


Total Bilirubin


  1.1 MG/DL


(0.2-1.0) 


  


  


 


 


Sodium Level


  134 MEQ/L


(136-145) 


  


  


 


 


Potassium Level


  3.4 MEQ/L


(3.5-5.1) 


  


  


 


 


Estimat Glomerular Filtration


Rate 60 ML/MIN


(>89) 


  


  70 ML/MIN


(>89)


 


Troponin I


  0.09 NG/ML


(0.02-0.05) 


  0.11 NG/ML


(0.02-0.05) 0.09 NG/ML


(0.02-0.05)


 


B-Type Natriuretic Peptide


  149 PG/ML


(0-100) 


  


  


 


 


White Blood Count


  


  


  


  11.9 TH/MM3


(4.0-11.0)


 


Lymphocytes (%) (Auto)


  


  


  


  6.3 %


(9.0-44.0)


 


Neutrophils # (Auto)


  


  


  


  10.8 TH/MM3


(1.8-7.7)


 


Lymphocytes # (Auto)


  


  


  


  0.7 TH/MM3


(1.0-4.8)


 


Test


  2/11/18


13:00 2/12/18


08:20 2/13/18


06:40 


 


 


White Blood Count


  


  16.7 TH/MM3


(4.0-11.0) 17.8 TH/MM3


(4.0-11.0) 


 


 


Red Blood Count


  


  3.68 MIL/MM3


(4.00-5.30) 3.58 MIL/MM3


(4.00-5.30) 


 


 


Hemoglobin


  


  11.1 GM/DL


(11.6-15.3) 10.9 GM/DL


(11.6-15.3) 


 


 


Hematocrit


  


  33.0 %


(35.0-46.0) 32.0 %


(35.0-46.0) 


 


 


Neutrophils (%) (Auto)


  


  89.0 %


(16.0-70.0) 


  


 


 


Lymphocytes (%) (Auto)


  


  6.1 %


(9.0-44.0) 


  


 


 


Neutrophils # (Auto)


  


  14.8 TH/MM3


(1.8-7.7) 


  


 


 


Blood Urea Nitrogen


  


  21 MG/DL


(7-18) 


  


 


 


Random Glucose


  


  134 MG/DL


() 173 MG/DL


() 


 


 


Albumin


  


  2.9 GM/DL


(3.4-5.0) 


  


 


 


Estimat Glomerular Filtration


Rate 


  69 ML/MIN


(>89) 66 ML/MIN


(>89) 


 


 


Calcium Level


  


  


  8.1 MG/DL


(8.5-10.1) 


 








PE at Discharge


GENERAL:  elderly female, lying in bed, currently on 2L NC, in NAD


SKIN: No rashes, ecchymoses or lesions. Cool and dry.


HEAD: Atraumatic. Normocephalic. No temporal or scalp tenderness.


EYES: Pupils equal round and reactive. Extraocular motions intact. No scleral 

icterus. No injection or drainage. 


ENT: Nose without bleeding, purulent drainage or septal hematoma. Throat 

without erythema, tonsillar hypertrophy or exudate. Uvula midline. Airway 

patent.


NECK: Trachea midline. No JVD or lymphadenopathy. Supple, nontender, no 

meningeal signs.


CARDIOVASCULAR: RRR, no m/r/g


RESPIRATORY: mild wheezes throughout all lung fields, improved from prior exam


GASTROINTESTINAL: Abdomen soft, non-tender, nondistended. No hepato-splenomegaly

, or palpable masses. No guarding.


MUSCULOSKELETAL: Extremities without clubbing, cyanosis, or edema. No joint 

tenderness, effusion, or edema noted. No calf tenderness. 


NEUROLOGICAL: Awake, alert, and oriented x3. No neuro deficits.











Elaine Schaffer MD R1 Feb 13, 2018 12:02

## 2018-02-13 NOTE — HHI.DCPOC
Discharge Care Plan


Diagnosis:  


(1) COPD (chronic obstructive pulmonary disease)


(2) CAD (coronary artery disease)


(3) Pneumonia


Goals to Promote Your Health


* To prevent worsening of your condition and complications


* To maintain your health at the optimal level


Directions to Meet Your Goals


*** Take your medications as prescribed


*** Follow your dietary instruction


*** Follow activity as directed








*** Keep your appointments as scheduled


*** Take your immunizations and boosters as scheduled


*** If your symptoms worsen call your PCP, if no PCP go to Urgent Care Center 

or Emergency Room***


*** Smoking is Dangerous to Your Health. Avoid second hand smoke***


***Call the 24-hour hour crisis hotline for domestic abuse at 1-547.825.6813***











Elaine Schaffer MD R1 Feb 13, 2018 12:02

## 2018-02-13 NOTE — HHI.FPPN
Subjective


Remarks


No acute events overnight. Pt doing well this AM. Currently on 2L O2 NC. Nurse 

reports that she did not pass her walk test, will need to go home with home O2. 

Patient endorses coughing. Otherwise, no complaints this AM. She denies CP, 

abdominal pain, and N/V. 


 (Elaine Schaffer MD R1)





Objective


Vitals





Vital Signs








  Date Time  Temp Pulse Resp B/P (MAP) Pulse Ox O2 Delivery O2 Flow Rate FiO2


 


2/13/18 07:31 97.4 88 18 125/65 (85) 93   


 


2/13/18 07:26     94 Nasal Cannula 2.00 


 


2/13/18 04:05 96.8 79 20 121/60 (80) 96   


 


2/13/18 03:05  84      


 


2/13/18 00:17 96.0 76 21 116/54 (74) 96   


 


2/12/18 20:58     98 Nasal Cannula 2.00 


 


2/12/18 20:24 96.6 89 20 122/60 (80) 98   


 


2/12/18 17:23     97 Nasal Cannula 2.00 


 


2/12/18 15:52 97.6 77 19 132/66 (88) 97   


 


2/12/18 11:32 97.7 79 18 114/55 (74) 94   














I/O      


 


 2/12/18 2/12/18 2/12/18 2/13/18 2/13/18 2/13/18





 07:00 15:00 23:00 07:00 15:00 23:00


 


Intake Total 500 ml 613 ml  600 ml  


 


Balance 500 ml 613 ml  600 ml  


 


      


 


Intake Oral 500 ml   600 ml  


 


IV Total  613 ml    


 


# Voids 1   5  


 


# Bowel Movements    1  








 (Elaine Schaffer MD R1)


Result Diagram:  


2/13/18 0640                                                                   

             2/13/18 0640





Objective Remarks


GENERAL:  elderly female, lying in bed, currently on 2L NC, in NAD


SKIN: No rashes, ecchymoses or lesions. Cool and dry.


HEAD: Atraumatic. Normocephalic. No temporal or scalp tenderness.


EYES: Pupils equal round and reactive. Extraocular motions intact. No scleral 

icterus. No injection or drainage. 


ENT: Nose without bleeding, purulent drainage or septal hematoma. Throat 

without erythema, tonsillar hypertrophy or exudate. Uvula midline. Airway 

patent.


NECK: Trachea midline. No JVD or lymphadenopathy. Supple, nontender, no 

meningeal signs.


CARDIOVASCULAR: RRR, no m/r/g


RESPIRATORY: mild wheezes throughout all lung fields, improved from prior exam


GASTROINTESTINAL: Abdomen soft, non-tender, nondistended. No hepato-splenomegaly

, or palpable masses. No guarding.


MUSCULOSKELETAL: Extremities without clubbing, cyanosis, or edema. No joint 

tenderness, effusion, or edema noted. No calf tenderness. 


NEUROLOGICAL: Awake, alert, and oriented x3. No neuro deficits.


 (Elaine Schaffer MD R1)





A/P


Assessment and Plan


82 yr old F w/ hypertension, hyperlipidemia, CAD s/p stent in LAD admitted for 

HCAP


Discharge Planning


Home health PT


Home O2


Discharge today


 (Elaine Schaffer MD R1)


Attending Attestation


Pt. examined and case reviewed with resident physician


I have read the above note and agree with the assessment/plan as discussed with 

me


I was involved in all medical decision making for this patient





Zach Everett MD


 (Zach Everett MD)


Problem List:  


(1) Healthcare-associated pneumonia


ICD Codes:  J18.9 - Pneumonia, unspecified organism


Status:  Acute


Plan:   Antibiotics:


Patient will discharge on home Levaquin 750mg daily for 4 days


Discontinue Vancomycin started 2/10/18


Discontinue Zosyn started 2/10/18


   - s/p Rocephin and Azithromycin in ED-discontinued due to patient's recent 

hospitalization





Urine Legionella and pneumococcal antigens pending


Blood cultures NGTD





Influenza nasal wash negative for influenza A or B





CBC showing leukocytosis of 11.9 with neutrophil percentage of 90.8%


Lactic acid 0.9


CXR demonstrates development of subsegmental infiltrates lower lateral left 

lung and right infrahilar region





Adjunctive care:


Solumedrol 40mg IV q12h, will be discharge on medrol dose pack


Duonebs AHN q4h, albuterol neb PRN


Tessalon pearls, mucinex, and magic mouth wash for cough/sore throat


Tylenol 650mg PO q4h for fever





(2) CAD (coronary artery disease)


ICD Codes:  I25.10 - Atherosclerotic heart disease of native coronary artery 

without angina pectoris


Status:  Chronic


Plan:  EKG: Sinus tachycardia at a rate of 100, occasional f premature complexes

, normal axis, lateral T-wave inversions, inferior Q waves, anterior precordial 

Q waves, no definite evidence of acute ischemia.  Compared to previous EKG from 

January 27 of this year, lateral precordial T-wave inversions are less 

pronounced on this EKG.


Troponin at 0.09 -> 0.11 -> 0.09


Monitor on cardiac telemetry





Evaluated by Dr. Kaur, continue tx for pneumonia as above, f/u outpatient 





Continue Plavix 75 mg PO daily and Aspirin chew 81mg daily


Continue Imdur 30mg PO daily








(3) HTN (hypertension)


ICD Codes:  I10 - Essential (primary) hypertension


Status:  Chronic


Plan:  Continue Amlodipine 10mg PO daily, Losartan 100mg PO daily, metoprolol 

tartrate 25mg PO bid





(4) Hyperlipemia


ICD Codes:  E78.5 - Hyperlipidemia, unspecified


Status:  Chronic


Plan:  Continue atorvastatin 80mg PO hs





(5) Hypokalemia


ICD Codes:  E87.6 - Hypokalemia


Status:  Resolved


Plan:  K+ 3.4 on arrival and is 3.7 status post replacement


Monitor and replete as needed





(6) Nutrition, metabolism, and development symptoms


ICD Codes:  R63.8 - Other symptoms and signs concerning food and fluid intake


Plan:  Diet: Heart healthy diet as tolerated


Fluids:not indicated at this time


vitals q4hr, monitor I &Os


DVT ppx: heparin 5000 units q8hr


 (Elaine Schaffer MD R1)











Elaine Schaffer MD R1 Feb 13, 2018 08:26


Zach Everett MD Feb 13, 2018 16:37

## 2018-02-13 NOTE — HHI.FF
Face to Face Verification


Diagnosis:  


(1) COPD (chronic obstructive pulmonary disease)


(2) Dyspnea on exertion


(3) Healthcare-associated pneumonia


(4) HTN (hypertension)


(5) Hyperlipemia


(6) CAD (coronary artery disease)


Physical Therapy


Order:  Evaluate and Treat, Improve ambulation, Strength and gait training





Home Health Nursing








Order: Oxygen administration education

















I have seen patient Ayaka Trevino on 2/13/18. My clinical findings 

support the need for the requested home health care services because:








 Ltd mobility - disease progression





 Patient has SOB





 Med compliance is questionable





 Limited ability to care for self





 High risk of falls














I certify that my clinical findings support that this patient is homebound 

because:








 Hx COPD- exertion dyspnea/weakness





 Unsteady gait/balance

















Elaine Schaffer MD R1 Feb 13, 2018 08:32

## 2018-02-13 NOTE — PD.CARD.PN
Subjective


Subjective Remarks


No CP, mild SOB, feels better





Objective


Vital Signs / I&O





Vital Signs








  Date Time  Temp Pulse Resp B/P (MAP) Pulse Ox O2 Delivery O2 Flow Rate FiO2


 


2/13/18 11:49 97.5 80 21 125/63 (83) 96   


 


2/13/18 08:00  89      


 


2/13/18 07:50       2.00 


 


2/13/18 07:31 97.4 88 18 125/65 (85) 93   


 


2/13/18 07:26     94 Nasal Cannula 2.00 


 


2/13/18 04:05 96.8 79 20 121/60 (80) 96   


 


2/13/18 03:05  84      


 


2/13/18 00:17 96.0 76 21 116/54 (74) 96   


 


2/12/18 20:58     98 Nasal Cannula 2.00 


 


2/12/18 20:24 96.6 89 20 122/60 (80) 98   


 


2/12/18 17:23     97 Nasal Cannula 2.00 














I/O      


 


 2/12/18 2/12/18 2/12/18 2/13/18 2/13/18 2/13/18





 07:00 15:00 23:00 07:00 15:00 23:00


 


Intake Total 500 ml 613 ml  600 ml 240 ml 


 


Balance 500 ml 613 ml  600 ml 240 ml 


 


      


 


Intake Oral 500 ml   600 ml 240 ml 


 


IV Total  613 ml    


 


# Voids 1   5  


 


# Bowel Movements    1  








Physical Exam


GENERAL: In NAD.


SKIN: Warm and dry.


HEAD: Normocephalic.


EYES: No scleral icterus. No injection or drainage. 


NECK: Supple, trachea midline. No JVD or lymphadenopathy.


CARDIOVASCULAR: Regular rate and rhythm without murmurs, gallops, or rubs. 


RESPIRATORY: Breath sounds equal bilaterally. No accessory muscle use. Few 

rhonchi.


GASTROINTESTINAL: Abdomen soft, non-tender, nondistended. 


MUSCULOSKELETAL: No cyanosis, or edema. 





Laboratory





Laboratory Tests








Test


  2/13/18


06:40


 


White Blood Count 17.8 TH/MM3 


 


Red Blood Count 3.58 MIL/MM3 


 


Hemoglobin 10.9 GM/DL 


 


Hematocrit 32.0 % 


 


Mean Corpuscular Volume 89.4 FL 


 


Mean Corpuscular Hemoglobin 30.5 PG 


 


Mean Corpuscular Hemoglobin


Concent 34.2 % 


 


 


Red Cell Distribution Width 14.2 % 


 


Platelet Count 221 TH/MM3 


 


Mean Platelet Volume 8.7 FL 


 


Blood Urea Nitrogen 18 MG/DL 


 


Creatinine 0.83 MG/DL 


 


Random Glucose 173 MG/DL 


 


Calcium Level 8.1 MG/DL 


 


Sodium Level 141 MEQ/L 


 


Potassium Level 3.5 MEQ/L 


 


Chloride Level 107 MEQ/L 


 


Carbon Dioxide Level 28.0 MEQ/L 


 


Anion Gap 6 MEQ/L 


 


Estimat Glomerular Filtration


Rate 66 ML/MIN 


 











Assessment and Plan


Problem List:  


(1) Dyspnea on exertion


ICD Codes:  R06.09 - Other forms of dyspnea


Status:  Acute


(2) CAD (coronary artery disease)


ICD Codes:  I25.10 - Atherosclerotic heart disease of native coronary artery 

without angina pectoris


Status:  Chronic


(3) Presence of stent in LAD coronary artery


ICD Codes:  Z95.5 - Presence of coronary angioplasty implant and graft


Status:  Acute


(4) Pneumonia


ICD Codes:  J18.9 - Pneumonia, unspecified organism


Status:  Acute


(5) HTN (hypertension)


ICD Codes:  I10 - Essential (primary) hypertension


Status:  Chronic


(6) Hyperlipemia


ICD Codes:  E78.5 - Hyperlipidemia, unspecified


Status:  Chronic


(7) COPD (chronic obstructive pulmonary disease)


ICD Codes:  J44.9 - Chronic obstructive pulmonary disease, unspecified


Status:  Acute


Assessment and Plan


Remains stable from cardiac standpoint. No angina, still with mild dyspnea. 

Cardiac enzymes without any trend, unlikely to represent ACS. Continue tx for 

pneumonia and COPD exacerbation. Continue tx for HTN and CAD. Increase 

activity. Will schedule f/u in our office after discharge.











Asha Kaur MD Feb 13, 2018 17:17

## 2018-02-14 VITALS
DIASTOLIC BLOOD PRESSURE: 81 MMHG | HEART RATE: 75 BPM | SYSTOLIC BLOOD PRESSURE: 139 MMHG | OXYGEN SATURATION: 97 % | RESPIRATION RATE: 24 BRPM

## 2018-02-14 VITALS
OXYGEN SATURATION: 98 % | HEART RATE: 72 BPM | SYSTOLIC BLOOD PRESSURE: 140 MMHG | DIASTOLIC BLOOD PRESSURE: 70 MMHG | RESPIRATION RATE: 18 BRPM

## 2018-02-14 VITALS
RESPIRATION RATE: 20 BRPM | DIASTOLIC BLOOD PRESSURE: 72 MMHG | OXYGEN SATURATION: 98 % | HEART RATE: 7 BPM | SYSTOLIC BLOOD PRESSURE: 148 MMHG

## 2018-02-14 VITALS — OXYGEN SATURATION: 96 %

## 2018-02-14 VITALS — OXYGEN SATURATION: 97 %

## 2018-02-14 RX ADMIN — IPRATROPIUM BROMIDE AND ALBUTEROL SULFATE SCH AMPULE: .5; 3 SOLUTION RESPIRATORY (INHALATION) at 10:40

## 2018-02-14 RX ADMIN — STANDARDIZED SENNA CONCENTRATE AND DOCUSATE SODIUM SCH TAB: 8.6; 5 TABLET, FILM COATED ORAL at 01:49

## 2018-02-14 RX ADMIN — PANTOPRAZOLE SCH MG: 40 TABLET, DELAYED RELEASE ORAL at 08:54

## 2018-02-14 RX ADMIN — PANTOPRAZOLE SCH MG: 40 TABLET, DELAYED RELEASE ORAL at 01:49

## 2018-02-14 RX ADMIN — STANDARDIZED SENNA CONCENTRATE AND DOCUSATE SODIUM SCH TAB: 8.6; 5 TABLET, FILM COATED ORAL at 08:53

## 2018-02-14 RX ADMIN — IPRATROPIUM BROMIDE AND ALBUTEROL SULFATE SCH AMPULE: .5; 3 SOLUTION RESPIRATORY (INHALATION) at 07:13

## 2018-02-14 RX ADMIN — IPRATROPIUM BROMIDE AND ALBUTEROL SULFATE SCH AMPULE: .5; 3 SOLUTION RESPIRATORY (INHALATION) at 04:00

## 2018-02-14 NOTE — RADRPT
EXAM DATE/TIME:  02/13/2018 23:55 

 

[facility] COMPARISON:     

CHEST SINGLE AP, February 10, 2018, 16:29.

 

                     

INDICATIONS :     

Shortness of breath.

                     

 

MEDICAL HISTORY :            

Hypertension. Chronic obstructive pulmonary disease. Myocardial infarction.   

 

SURGICAL HISTORY :     

Mastectomy, left.   

 

ENCOUNTER:     

Initial                                        

 

ACUITY:     

1 day      

 

PAIN SCORE:     

0/10

 

LOCATION:     

Bilateral  chest

 

FINDINGS:     

Reidentified is stable patchy basilar airspace disease. Cardiomegaly. No obvious effusions. Osseous s
tructures are intact.

 

CONCLUSION:     

No significant change has occurred.  

 

 

 

 Gustavo Keenan MD on February 14, 2018 at 0:05           

Board Certified Radiologist.

 This report was verified electronically.

## 2018-02-14 NOTE — HHI.FF
Face to Face Verification


Diagnosis:  


(1) Healthcare-associated pneumonia


(2) Hypertension


(3) COPD (chronic obstructive pulmonary disease)


Physical Therapy


Order:  Evaluate and Treat, Improve ambulation, Strength and gait training





Home Health Nursing








Order: Signs/symptoms of disease process





 Oxygen administration education

















I have seen patient Ayaka Trevino on 2/14/18. My clinical findings 

support the need for the requested home health care services because:








 Patient has SOB





 Deconditioned w/ increased weakness





 Limited ability to care for self





 High risk of falls














I certify that my clinical findings support that this patient is homebound 

because:








 Hx COPD- exertion dyspnea/weakness

















Elaine Schaffer MD R1 Feb 14, 2018 10:22

## 2018-02-14 NOTE — HHI.HP
HPI


Service


Family Medicine


Primary Care Physician


No Primary Care Physician


Admission Diagnosis





Pneumonia


Diagnoses:  


International Travel<30 Days:  No


Contact w/Intl Traveler<30days:  No


Known Affected Area:  No


History of Present Illness


The patient is a pleasant 82 year old woman with h/o HTN, CAD s/p stenting, HLD 

being readmitted due to shortness of breath and tachypnea after her oxygen ran 

out at home. The patient was discharged on  after being admitted here from 2

/10- for HCAP. The patient states she got home around 14:00 and ran out of 

O2 at home ilan. 20:00. About 90 minutes later she began to feel very short of 

breath, tachypneic, and anxious. She states she was lying in bed when her 

symptoms began. She states she currently feels much better without any 

shortness of breath after receiving a breathing treatment earlier in the ED. 

She states she was able to obtain all of her discharge medications including 

Levaquin and has taken her first dose of this. She denies any chest pain, 

palpitations, fevers, headaches, abdominal pain. States her cough has improved 

since her previous admission but is still present and continues to be 

productive.


 (Italo Mendieta MD)





Review of Systems


Constitutional:  DENIES: Fever, Chills, Dizziness, Change in appetite


Eyes:  DENIES: Blurred vision, Diplopia


Respiratory:  COMPLAINS OF: Cough, Sputum production, Shortness of breath


Cardiovascular:  DENIES: Chest pain, Palpitations


Gastrointestinal:  COMPLAINS OF: Constipation (Endorses passing small sometimes 

hard stools, BMs are regular), DENIES: Abdominal pain, Black stools, Bloody 

stools, Diarrhea, Nausea, Vomiting


Genitourinary:  DENIES: Hematuria, Dysuria


Integumentary:  DENIES: Rash


Neurologic:  DENIES: Headache, Localized weakness (Italo Mendieta MD)





Past Family Social History


Past Medical History


HTN 


Hx of Breast CA


Hyperlipidemia  


CAD s/p LAD stent 17


Past Surgical History


Cardiac cath 17


Bilateral Cataract Surgery


Total Hysterectomy


Left Mastectomy


Left knee replacement


 (Italo Mendieta MD)


Allergies:  


Coded Allergies:  


     lisinopril (Unverified  Allergy, Mild, COUGH, 18)


Family History


No FH of DM or CAD


Social History


Never smoker


Denies alcohol or illicit drug use


 (Italo Mendieta MD)





Physical Exam


Vital Signs





Vital Signs








  Date Time  Temp Pulse Resp B/P (MAP) Pulse Ox O2 Delivery O2 Flow Rate FiO2


 


18 23:32 97.7 78 32 156/72 (100) 95   








Physical Exam


GENERAL: NAD, lying comfortably in bed


NEURO: Alert. Normal speech. CNs grossly intact. Motor grossly normal.


SKIN: Warm and dry. No rashes or erythema.


HEAD: Normocephalic. Atraumatic.


EYES: PERRL. EOMI. No scleral icterus. No injection or drainage. 


ENT: No nasal drainage. Moist mucous membranes. No oral ulcers or lesions.


NECK: Supple, trachea midline. No JVD or lymphadenopathy.


CARDIOVASCULAR: Regular rate and rhythm without murmurs, rubs, or gallops. 

Peripheral pulses 2+. Capillary refill < 2 seconds.


RESPIRATORY: Coarse rales in mid and lower lung fields bilaterally, no wheezing 

or rhonchi. Breath sounds are equal. No accessory muscle use.


GASTROINTESTINAL: Abdomen soft, nontender, protuberant. No rebound tenderness. 

No guarding.


MUSCULOSKELETAL: No lower extremity edema. Normal range of motion. No calf 

tenderness.


BACK: Nontender without obvious deformity.


 (Italo Mendieta MD)





Caprini VTE Risk Assessment


Caprini VTE Risk Assessment:  Mod/High Risk (score >= 2)


Caprini Risk Assessment Model











 Point Value = 1          Point Value = 2  Point Value = 3  Point Value = 5


 


Age 41-60


Minor surgery


BMI > 25 kg/m2


Swollen legs


Varicose veins


Pregnancy or postpartum


History of unexplained or recurrent


   spontaneous 


Oral contraceptives or hormone


   replacement


Sepsis (< 1 month)


Serious lung disease, including


   pneumonia (< 1 month)


Abnormal pulmonary function


Acute myocardial infarction


Congestive heart failure (< 1 month)


History of inflammatory bowel disease


Medical patient at bed rest Age 61-74


Arthroscopic surgery


Major open surgery (> 45 min)


Laparoscopic surgery (> 45 min)


Malignancy


Confined to bed (> 72 hours)


Immobilizing plaster cast


Central venous access Age >= 75


History of VTE


Family history of VTE


Factor V Leiden


Prothrombin 87993I


Lupus anticoagulant


Anticardiolipin antibodies


Elevated serum homocysteine


Heparin-induced thrombocytopenia


Other congenital or acquired


   thrombophilia Stroke (< 1 month)


Elective arthroplasty


Hip, pelvis, or leg fracture


Acute spinal cord injury (< 1 month)








Prophylaxis Regimen











   Total Risk


Factor Score Risk Level Prophylaxis Regimen


 


0-1      Low Early ambulation


 


2 Moderate Order ONE of the following:


*Sequential Compression Device (SCD)


*Heparin 5000 units SQ BID


 


3-4 Higher Order ONE of the following medications:


*Heparin 5000 units SQ TID


*Enoxaparin/Lovenox 40 mg SQ daily (WT < 150 kg, CrCl > 30 mL/min)


*Enoxaparin/Lovenox 30 mg SQ daily (WT < 150 kg, CrCl > 10-29 mL/min)


*Enoxaparin/Lovenox 30 mg SQ BID (WT < 150 kg, CrCl > 30 mL/min)


AND/OR


*Sequential Compression Device (SCD)


 


5 or more Highest Order ONE of the following medications:


*Heparin 5000 units SQ TID (Preferred with Epidurals)


*Enoxaparin/Lovenox 40 mg SQ daily (WT < 150 kg, CrCl > 30 mL/min)


*Enoxaparin/Lovenox 30 mg SQ daily (WT < 150 kg, CrCl > 10-29 mL/min)


*Enoxaparin/Lovenox 30 mg SQ BID (WT < 150 kg, CrCl > 30 mL/min)


AND


*Sequential Compression Device (SCD)








 (Italo Mendieta MD)





Assessment and Plan


Assessment and Plan


82 year old woman being admitted due to SOB after home O2 ran out.


Code Status


Full code


Discussed Condition With


[facility] family medicine team


 (Italo Mendieta MD)


Attending Attestation


Patient seen and examined.  Case reviewed and discussed with the resident team.

  Agree with plan of care as discussed with me and documented in the resident 

note.


pt and daughter stated she was sent home with an oxygen tank but no respiratory 

company came to her house and she did not know their phone number so just 

returned to the hospital when her oxygen ran out. She feels well on oxygen and 

wishes to go home today. her daughter is with her and plans on stopping by the 

oxygen supplier on the way home so there should not be a repeat of yesterday.


suspect asthma as she has a history of this and was never a smoker. recommend 

steroid inhaler and also strongly encouraged her to find a primary care Dr and 

follow up


 (Maddy Montero MD)


Problem List:  


(1) Shortness of breath


ICD Codes:  R06.02 - Shortness of breath


Status:  Acute


Plan:  Improved s/p duonebs and lidocaine nebs in the ED


Consider acute hypoxia due to inadequate O2 supply, COPD exacerbation, P/E, 

pneumonia


Continue duonebs q4h while awake


Albuterol as needed


CM consult to assist with discharge needs including home oxygen





(2) Pneumonia


ICD Codes:  J18.9 - Pneumonia, unspecified organism


Plan:  Continue Levaquin 750 mg po daily


Continue medrol dosepack, instruct patient to continue with appropriate dose 

upon discharge


Continue supportive therapy, duonebs and albuterol as above, tessalon or 

mucinex prn cough





(3) Hypertension


ICD Codes:  I10 - Essential (primary) hypertension


Status:  Chronic


Plan:  Continue home antihypertensives


- Amlodipine 10 mg po daily


- Losartan 100 mg po daily


- Metoprolol tart 25 mg po bid





(4) CAD (coronary artery disease)


ICD Codes:  I25.10 - Atherosclerotic heart disease of native coronary artery 

without angina pectoris


Status:  Chronic


Plan:  Continue aspirin and plavix





(5) Hyperlipidemia


ICD Codes:  E78.5 - Hyperlipidemia, unspecified


Status:  Chronic


Plan:  Continue Lipitor





(6) Nutrition, metabolism, and development symptoms


ICD Codes:  R63.8 - Other symptoms and signs concerning food and fluid intake


Plan:  Fluids: PO


Heart healthy diet


Protonix 40 mg po daily for GI ppx


DVT ppx: Lovenox 40 mg sq q24h


 (Italo Mendieta MD)





Physician Certification


2 Midnight Certification Type:  Admission for Inpatient Services


Order for Inpatient Services


The services are ordered in accordance with Medicare regulations or non-

Medicare payer requirements, as applicable.  In the case of services not 

specified as inpatient-only, they are appropriately provided as inpatient 

services in accordance with the 2-midnight benchmark.


Estimated LOS (days):  1


 days is the estimated time the patient will need to remain in the hospital, 

assuming treatment plan goals are met and no additional complications.


Post-Hospital Plan:  Home


 (Italo Mendieta MD)











Italo Mendieta MD 2018 01:02


Maddy Montero MD 2018 11:17

## 2018-11-01 NOTE — HHI.DCPOC
Discharge Care Plan


Diagnosis:  


(1) Healthcare-associated pneumonia


Goals to Promote Your Health


* To prevent worsening of your condition and complications


* To maintain your health at the optimal level


Directions to Meet Your Goals


*** Take your medications as prescribed


*** Follow your dietary instruction


*** Follow activity as directed








*** Keep your appointments as scheduled


*** Take your immunizations and boosters as scheduled


*** If your symptoms worsen call your PCP, if no PCP go to Urgent Care Center 

or Emergency Room***


*** Smoking is Dangerous to Your Health. Avoid second hand smoke***


***Call the 24-hour hour crisis hotline for domestic abuse at 1-577.522.6459***











Elaine Schaffer MD R1 Feb 14, 2018 10:22
<<-----Click here for Discharge Medication Review